# Patient Record
Sex: MALE | Race: WHITE | Employment: FULL TIME | URBAN - METROPOLITAN AREA
[De-identification: names, ages, dates, MRNs, and addresses within clinical notes are randomized per-mention and may not be internally consistent; named-entity substitution may affect disease eponyms.]

---

## 2017-01-06 NOTE — IMAGING NOTE
Called and talked to patient and instructed to be on NPO X 6 hours prior,have a ,be at the hospital not later than 0800am,and the recovery is 3-4 hours post procedure. Will hold the declofenac for now. Verbalized understanding ,reassured.  Will take h

## 2017-01-09 ENCOUNTER — APPOINTMENT (OUTPATIENT)
Dept: LAB | Facility: HOSPITAL | Age: 52
End: 2017-01-09
Attending: PHYSICIAN ASSISTANT
Payer: COMMERCIAL

## 2017-01-09 ENCOUNTER — HOSPITAL ENCOUNTER (OUTPATIENT)
Dept: CT IMAGING | Facility: HOSPITAL | Age: 52
Discharge: HOME | End: 2017-01-09
Attending: PHYSICIAN ASSISTANT
Payer: COMMERCIAL

## 2017-01-09 VITALS
TEMPERATURE: 98 F | BODY MASS INDEX: 31.82 KG/M2 | HEIGHT: 66 IN | DIASTOLIC BLOOD PRESSURE: 73 MMHG | RESPIRATION RATE: 16 BRPM | WEIGHT: 198 LBS | HEART RATE: 67 BPM | OXYGEN SATURATION: 99 % | SYSTOLIC BLOOD PRESSURE: 112 MMHG

## 2017-01-09 DIAGNOSIS — N28.89 RENAL MASS: Primary | ICD-10-CM

## 2017-01-09 DIAGNOSIS — N28.89 RENAL MASS: ICD-10-CM

## 2017-01-09 LAB
INR BLD: 1 (ref 0.89–1.12)
PSA SERPL DL<=0.01 NG/ML-MCNC: 13.5 SECONDS (ref 12.3–14.8)

## 2017-01-09 PROCEDURE — 50200 RENAL BIOPSY PERQ: CPT

## 2017-01-09 PROCEDURE — 99152 MOD SED SAME PHYS/QHP 5/>YRS: CPT

## 2017-01-09 PROCEDURE — 99153 MOD SED SAME PHYS/QHP EA: CPT

## 2017-01-09 PROCEDURE — 36415 COLL VENOUS BLD VENIPUNCTURE: CPT

## 2017-01-09 PROCEDURE — 85610 PROTHROMBIN TIME: CPT

## 2017-01-09 PROCEDURE — 88341 IMHCHEM/IMCYTCHM EA ADD ANTB: CPT | Performed by: PHYSICIAN ASSISTANT

## 2017-01-09 PROCEDURE — 88305 TISSUE EXAM BY PATHOLOGIST: CPT | Performed by: PHYSICIAN ASSISTANT

## 2017-01-09 PROCEDURE — 77012 CT SCAN FOR NEEDLE BIOPSY: CPT

## 2017-01-09 PROCEDURE — 88342 IMHCHEM/IMCYTCHM 1ST ANTB: CPT | Performed by: PHYSICIAN ASSISTANT

## 2017-01-09 RX ORDER — MIDAZOLAM HYDROCHLORIDE 1 MG/ML
INJECTION INTRAMUSCULAR; INTRAVENOUS
Status: COMPLETED
Start: 2017-01-09 | End: 2017-01-09

## 2017-01-09 RX ORDER — NALOXONE HYDROCHLORIDE 0.4 MG/ML
80 INJECTION, SOLUTION INTRAMUSCULAR; INTRAVENOUS; SUBCUTANEOUS AS NEEDED
Status: DISCONTINUED | OUTPATIENT
Start: 2017-01-09 | End: 2017-01-13

## 2017-01-09 RX ORDER — HYDROCODONE BITARTRATE AND ACETAMINOPHEN 5; 325 MG/1; MG/1
1 TABLET ORAL ONCE
Status: COMPLETED | OUTPATIENT
Start: 2017-01-09 | End: 2017-01-09

## 2017-01-09 RX ORDER — FLUMAZENIL 0.1 MG/ML
INJECTION, SOLUTION INTRAVENOUS
Status: DISCONTINUED
Start: 2017-01-09 | End: 2017-01-09 | Stop reason: WASHOUT

## 2017-01-09 RX ORDER — SODIUM CHLORIDE 9 MG/ML
INJECTION, SOLUTION INTRAVENOUS CONTINUOUS
Status: DISCONTINUED | OUTPATIENT
Start: 2017-01-09 | End: 2017-01-13

## 2017-01-09 RX ORDER — NALOXONE HYDROCHLORIDE 0.4 MG/ML
INJECTION, SOLUTION INTRAMUSCULAR; INTRAVENOUS; SUBCUTANEOUS
Status: DISCONTINUED
Start: 2017-01-09 | End: 2017-01-09 | Stop reason: WASHOUT

## 2017-01-09 RX ORDER — FLUMAZENIL 0.1 MG/ML
0.2 INJECTION, SOLUTION INTRAVENOUS AS NEEDED
Status: DISCONTINUED | OUTPATIENT
Start: 2017-01-09 | End: 2017-01-13

## 2017-01-09 RX ORDER — MIDAZOLAM HYDROCHLORIDE 1 MG/ML
1 INJECTION INTRAMUSCULAR; INTRAVENOUS EVERY 5 MIN PRN
Status: ACTIVE | OUTPATIENT
Start: 2017-01-09 | End: 2017-01-09

## 2017-01-09 RX ADMIN — HYDROCODONE BITARTRATE AND ACETAMINOPHEN 1 TABLET: 5; 325 TABLET ORAL at 11:13:00

## 2017-01-09 RX ADMIN — MIDAZOLAM HYDROCHLORIDE 1 MG: 1 INJECTION INTRAMUSCULAR; INTRAVENOUS at 09:47:00

## 2017-01-09 RX ADMIN — SODIUM CHLORIDE 10 ML: 9 INJECTION, SOLUTION INTRAVENOUS at 09:15:00

## 2017-01-09 NOTE — PROCEDURES
[facility]  Procedure Note    Ardyth Sickle Patient Status:  Outpatient    1965 MRN [id number]   [facility] CT Attending Raya Licona, [address] Day # 0 PCP Judi Blair MD     Procedure: ct guided renal biops

## 2017-01-09 NOTE — IMAGING NOTE
Report given to [facility] RN about this patient post CT GUIDED RENAL BIOPSY  With IV sedation. Patient had a stable VS,tolerated the procedure well.  To [facility] [id number] in stable post procedure condition per transport

## 2017-01-09 NOTE — PRE-SEDATION ASSESSMENT
H&P 12/20 reviewed, no changes. Pt with left renal mass for CT biopsy. No prior sedation. Airway checked.   I have discussed with the patient the potential benefits, risks and side effects of this procedure, the likelihood of the patient achieving goals; an

## 2017-01-09 NOTE — OR NURSING
Dr. Ross Calle called and updated on patients condition, ok to discharge patient to home. Discharge instructions discussed with patient and wife, verbalized understanding.

## 2017-01-21 PROBLEM — D30.02 RENAL ONCOCYTOMA OF LEFT KIDNEY: Status: ACTIVE | Noted: 2017-01-21

## 2017-02-06 ENCOUNTER — ANESTHESIA EVENT (OUTPATIENT)
Dept: SURGERY | Facility: HOSPITAL | Age: 52
DRG: 657 | End: 2017-02-06
Payer: COMMERCIAL

## 2017-02-06 ENCOUNTER — LAB ENCOUNTER (OUTPATIENT)
Dept: LAB | Facility: HOSPITAL | Age: 52
End: 2017-02-06
Attending: UROLOGY
Payer: COMMERCIAL

## 2017-02-06 DIAGNOSIS — D30.02 RENAL ONCOCYTOMA OF LEFT KIDNEY: ICD-10-CM

## 2017-02-06 LAB
ANTIBODY SCREEN: NEGATIVE
RH BLOOD TYPE: POSITIVE

## 2017-02-06 PROCEDURE — 86901 BLOOD TYPING SEROLOGIC RH(D): CPT

## 2017-02-06 PROCEDURE — 86850 RBC ANTIBODY SCREEN: CPT

## 2017-02-06 PROCEDURE — 36415 COLL VENOUS BLD VENIPUNCTURE: CPT

## 2017-02-06 PROCEDURE — 86900 BLOOD TYPING SEROLOGIC ABO: CPT

## 2017-02-21 ENCOUNTER — ANESTHESIA (OUTPATIENT)
Dept: SURGERY | Facility: HOSPITAL | Age: 52
DRG: 657 | End: 2017-02-21
Payer: COMMERCIAL

## 2017-02-21 ENCOUNTER — HOSPITAL ENCOUNTER (INPATIENT)
Facility: HOSPITAL | Age: 52
LOS: 3 days | Discharge: HOME | DRG: 657 | End: 2017-02-24
Attending: UROLOGY | Admitting: UROLOGY
Payer: COMMERCIAL

## 2017-02-21 ENCOUNTER — SURGERY (OUTPATIENT)
Age: 52
End: 2017-02-21

## 2017-02-21 DIAGNOSIS — D30.02 RENAL ONCOCYTOMA OF LEFT KIDNEY: Primary | ICD-10-CM

## 2017-02-21 DIAGNOSIS — I10 ESSENTIAL HYPERTENSION: ICD-10-CM

## 2017-02-21 LAB
BASOPHILS # BLD AUTO: 0.04 X10(3) UL (ref 0–0.1)
BASOPHILS NFR BLD AUTO: 0.2 %
EOSINOPHIL # BLD AUTO: 0 X10(3) UL (ref 0–0.3)
EOSINOPHIL NFR BLD AUTO: 0 %
ERYTHROCYTE [DISTWIDTH] IN BLOOD BY AUTOMATED COUNT: 12.8 % (ref 11.5–16)
GLUCOSE BLD-MCNC: 114 MG/DL (ref 65–99)
HCT VFR BLD AUTO: 39.9 % (ref 37–53)
HGB BLD-MCNC: 13.2 G/DL (ref 13–17)
IMMATURE GRANULOCYTE COUNT: 0.11 X10(3) UL (ref 0–1)
IMMATURE GRANULOCYTE RATIO %: 0.7 %
ISTAT BLOOD GAS BASE DEFICIT: -2 MMOL/L
ISTAT BLOOD GAS HCO3: 24 MEQ/L (ref 22–26)
ISTAT BLOOD GAS O2 SATURATION: 100 % (ref 92–100)
ISTAT BLOOD GAS PCO2: 48 MMHG (ref 35–45)
ISTAT BLOOD GAS PH: 7.31 (ref 7.35–7.45)
ISTAT BLOOD GAS PO2: 277 MMHG (ref 80–105)
ISTAT BLOOD GAS TCO2: 25 MMOL/L (ref 22–32)
ISTAT HEMATOCRIT: 40 % (ref 37–54)
ISTAT IONIZED CALCIUM: 1.21 MMOL/L (ref 1.12–1.32)
ISTAT POTASSIUM: 4.1 MMOL/L (ref 3.6–5.1)
ISTAT SODIUM: 138 MMOL/L (ref 136–144)
LYMPHOCYTES # BLD AUTO: 0.84 X10(3) UL (ref 0.9–4)
LYMPHOCYTES NFR BLD AUTO: 5.1 %
MCH RBC QN AUTO: 30.3 PG (ref 27–33.2)
MCHC RBC AUTO-ENTMCNC: 33.1 G/DL (ref 31–37)
MCV RBC AUTO: 91.5 FL (ref 80–99)
MONOCYTES # BLD AUTO: 0.77 X10(3) UL (ref 0.1–0.6)
MONOCYTES NFR BLD AUTO: 4.6 %
NEUTROPHIL ABS PRELIM: 14.85 X10 (3) UL (ref 1.3–6.7)
NEUTROPHILS # BLD AUTO: 14.85 X10(3) UL (ref 1.3–6.7)
NEUTROPHILS NFR BLD AUTO: 89.4 %
PLATELET # BLD AUTO: 345 10(3)UL (ref 150–450)
RBC # BLD AUTO: 4.36 X10(6)UL (ref 4.3–5.7)
RED CELL DISTRIBUTION WIDTH-SD: 42.4 FL (ref 35.1–46.3)
WBC # BLD AUTO: 16.6 X10(3) UL (ref 4–13)

## 2017-02-21 PROCEDURE — 85014 HEMATOCRIT: CPT

## 2017-02-21 PROCEDURE — 85025 COMPLETE CBC W/AUTO DIFF WBC: CPT | Performed by: ANESTHESIOLOGY

## 2017-02-21 PROCEDURE — 82803 BLOOD GASES ANY COMBINATION: CPT

## 2017-02-21 PROCEDURE — 8E0W4CZ ROBOTIC ASSISTED PROCEDURE OF TRUNK REGION, PERCUTANEOUS ENDOSCOPIC APPROACH: ICD-10-PCS | Performed by: UROLOGY

## 2017-02-21 PROCEDURE — 88307 TISSUE EXAM BY PATHOLOGIST: CPT | Performed by: UROLOGY

## 2017-02-21 PROCEDURE — 84295 ASSAY OF SERUM SODIUM: CPT

## 2017-02-21 PROCEDURE — 88341 IMHCHEM/IMCYTCHM EA ADD ANTB: CPT | Performed by: UROLOGY

## 2017-02-21 PROCEDURE — 88342 IMHCHEM/IMCYTCHM 1ST ANTB: CPT | Performed by: UROLOGY

## 2017-02-21 PROCEDURE — 84132 ASSAY OF SERUM POTASSIUM: CPT

## 2017-02-21 PROCEDURE — 0TB14ZZ EXCISION OF LEFT KIDNEY, PERCUTANEOUS ENDOSCOPIC APPROACH: ICD-10-PCS | Performed by: UROLOGY

## 2017-02-21 PROCEDURE — 82330 ASSAY OF CALCIUM: CPT

## 2017-02-21 DEVICE — SURGIFLO HEMOSTATIC MATRIX KIT: Type: IMPLANTABLE DEVICE | Site: ABDOMEN | Status: FUNCTIONAL

## 2017-02-21 RX ORDER — METOCLOPRAMIDE HYDROCHLORIDE 5 MG/ML
10 INJECTION INTRAMUSCULAR; INTRAVENOUS AS NEEDED
Status: DISCONTINUED | OUTPATIENT
Start: 2017-02-21 | End: 2017-02-21 | Stop reason: HOSPADM

## 2017-02-21 RX ORDER — ONDANSETRON 2 MG/ML
4 INJECTION INTRAMUSCULAR; INTRAVENOUS EVERY 6 HOURS PRN
Status: DISCONTINUED | OUTPATIENT
Start: 2017-02-21 | End: 2017-02-24

## 2017-02-21 RX ORDER — HYDROMORPHONE HYDROCHLORIDE 1 MG/ML
0.5 INJECTION, SOLUTION INTRAMUSCULAR; INTRAVENOUS; SUBCUTANEOUS EVERY 2 HOUR PRN
Status: DISCONTINUED | OUTPATIENT
Start: 2017-02-21 | End: 2017-02-24

## 2017-02-21 RX ORDER — NALOXONE HYDROCHLORIDE 0.4 MG/ML
80 INJECTION, SOLUTION INTRAMUSCULAR; INTRAVENOUS; SUBCUTANEOUS AS NEEDED
Status: DISCONTINUED | OUTPATIENT
Start: 2017-02-21 | End: 2017-02-21 | Stop reason: HOSPADM

## 2017-02-21 RX ORDER — SODIUM CHLORIDE, SODIUM LACTATE, POTASSIUM CHLORIDE, CALCIUM CHLORIDE 600; 310; 30; 20 MG/100ML; MG/100ML; MG/100ML; MG/100ML
INJECTION, SOLUTION INTRAVENOUS CONTINUOUS
Status: DISCONTINUED | OUTPATIENT
Start: 2017-02-21 | End: 2017-02-21

## 2017-02-21 RX ORDER — HYDROCODONE BITARTRATE AND ACETAMINOPHEN 5; 325 MG/1; MG/1
2 TABLET ORAL EVERY 4 HOURS PRN
Status: DISCONTINUED | OUTPATIENT
Start: 2017-02-21 | End: 2017-02-24

## 2017-02-21 RX ORDER — HEPARIN SODIUM 5000 [USP'U]/ML
5000 INJECTION, SOLUTION INTRAVENOUS; SUBCUTANEOUS ONCE
Status: COMPLETED | OUTPATIENT
Start: 2017-02-21 | End: 2017-02-21

## 2017-02-21 RX ORDER — HYDROCODONE BITARTRATE AND ACETAMINOPHEN 5; 325 MG/1; MG/1
2 TABLET ORAL AS NEEDED
Status: DISCONTINUED | OUTPATIENT
Start: 2017-02-21 | End: 2017-02-21 | Stop reason: HOSPADM

## 2017-02-21 RX ORDER — ROPIVACAINE HYDROCHLORIDE 5 MG/ML
INJECTION, SOLUTION EPIDURAL; INFILTRATION; PERINEURAL AS NEEDED
Status: DISCONTINUED | OUTPATIENT
Start: 2017-02-21 | End: 2017-02-21 | Stop reason: HOSPADM

## 2017-02-21 RX ORDER — LISINOPRIL AND HYDROCHLOROTHIAZIDE 12.5; 1 MG/1; MG/1
12.5 TABLET ORAL DAILY
Status: DISCONTINUED | OUTPATIENT
Start: 2017-02-21 | End: 2017-02-21 | Stop reason: RX

## 2017-02-21 RX ORDER — MEPERIDINE HYDROCHLORIDE 25 MG/ML
12.5 INJECTION INTRAMUSCULAR; INTRAVENOUS; SUBCUTANEOUS AS NEEDED
Status: COMPLETED | OUTPATIENT
Start: 2017-02-21 | End: 2017-02-21

## 2017-02-21 RX ORDER — ZOLPIDEM TARTRATE 5 MG/1
5 TABLET ORAL NIGHTLY PRN
Status: DISCONTINUED | OUTPATIENT
Start: 2017-02-21 | End: 2017-02-24

## 2017-02-21 RX ORDER — LISINOPRIL 10 MG/1
10 TABLET ORAL DAILY
Status: DISCONTINUED | OUTPATIENT
Start: 2017-02-22 | End: 2017-02-22

## 2017-02-21 RX ORDER — HEPARIN SODIUM 5000 [USP'U]/ML
INJECTION, SOLUTION INTRAVENOUS; SUBCUTANEOUS
Status: COMPLETED
Start: 2017-02-21 | End: 2017-02-21

## 2017-02-21 RX ORDER — PANTOPRAZOLE SODIUM 40 MG/1
40 TABLET, DELAYED RELEASE ORAL
Status: DISCONTINUED | OUTPATIENT
Start: 2017-02-22 | End: 2017-02-24

## 2017-02-21 RX ORDER — HYDROCODONE BITARTRATE AND ACETAMINOPHEN 5; 325 MG/1; MG/1
1 TABLET ORAL AS NEEDED
Status: DISCONTINUED | OUTPATIENT
Start: 2017-02-21 | End: 2017-02-21 | Stop reason: HOSPADM

## 2017-02-21 RX ORDER — ACETAMINOPHEN 10 MG/ML
INJECTION, SOLUTION INTRAVENOUS
Status: DISCONTINUED | OUTPATIENT
Start: 2017-02-21 | End: 2017-02-21 | Stop reason: HOSPADM

## 2017-02-21 RX ORDER — ACETAMINOPHEN 325 MG/1
650 TABLET ORAL EVERY 4 HOURS PRN
Status: DISCONTINUED | OUTPATIENT
Start: 2017-02-21 | End: 2017-02-24

## 2017-02-21 RX ORDER — DOCUSATE SODIUM 100 MG/1
100 CAPSULE, LIQUID FILLED ORAL 2 TIMES DAILY
Status: DISCONTINUED | OUTPATIENT
Start: 2017-02-21 | End: 2017-02-24

## 2017-02-21 RX ORDER — ONDANSETRON 2 MG/ML
4 INJECTION INTRAMUSCULAR; INTRAVENOUS AS NEEDED
Status: DISCONTINUED | OUTPATIENT
Start: 2017-02-21 | End: 2017-02-21 | Stop reason: HOSPADM

## 2017-02-21 RX ORDER — ONDANSETRON 4 MG/1
4 TABLET, FILM COATED ORAL EVERY 6 HOURS PRN
Status: DISCONTINUED | OUTPATIENT
Start: 2017-02-21 | End: 2017-02-24

## 2017-02-21 RX ORDER — SODIUM CHLORIDE, SODIUM LACTATE, POTASSIUM CHLORIDE, CALCIUM CHLORIDE 600; 310; 30; 20 MG/100ML; MG/100ML; MG/100ML; MG/100ML
INJECTION, SOLUTION INTRAVENOUS CONTINUOUS
Status: DISCONTINUED | OUTPATIENT
Start: 2017-02-21 | End: 2017-02-22

## 2017-02-21 RX ORDER — HYDROMORPHONE HYDROCHLORIDE 1 MG/ML
0.4 INJECTION, SOLUTION INTRAMUSCULAR; INTRAVENOUS; SUBCUTANEOUS EVERY 5 MIN PRN
Status: DISCONTINUED | OUTPATIENT
Start: 2017-02-21 | End: 2017-02-21 | Stop reason: HOSPADM

## 2017-02-21 RX ORDER — HYDROCODONE BITARTRATE AND ACETAMINOPHEN 5; 325 MG/1; MG/1
1 TABLET ORAL EVERY 4 HOURS PRN
Status: DISCONTINUED | OUTPATIENT
Start: 2017-02-21 | End: 2017-02-24

## 2017-02-21 RX ORDER — SODIUM CHLORIDE 9 MG/ML
INJECTION, SOLUTION INTRAVENOUS CONTINUOUS
Status: DISCONTINUED | OUTPATIENT
Start: 2017-02-21 | End: 2017-02-22

## 2017-02-21 RX ORDER — MEPERIDINE HYDROCHLORIDE 25 MG/ML
INJECTION INTRAMUSCULAR; INTRAVENOUS; SUBCUTANEOUS
Status: COMPLETED
Start: 2017-02-21 | End: 2017-02-21

## 2017-02-21 RX ORDER — HYDROMORPHONE HYDROCHLORIDE 1 MG/ML
INJECTION, SOLUTION INTRAMUSCULAR; INTRAVENOUS; SUBCUTANEOUS
Status: COMPLETED
Start: 2017-02-21 | End: 2017-02-21

## 2017-02-21 RX ORDER — LEVOTHYROXINE SODIUM 0.15 MG/1
150 TABLET ORAL
Status: DISCONTINUED | OUTPATIENT
Start: 2017-02-21 | End: 2017-02-24

## 2017-02-21 RX ORDER — MIDAZOLAM HYDROCHLORIDE 1 MG/ML
1 INJECTION INTRAMUSCULAR; INTRAVENOUS EVERY 5 MIN PRN
Status: DISCONTINUED | OUTPATIENT
Start: 2017-02-21 | End: 2017-02-21 | Stop reason: HOSPADM

## 2017-02-21 RX ORDER — HYDROMORPHONE HYDROCHLORIDE 1 MG/ML
1 INJECTION, SOLUTION INTRAMUSCULAR; INTRAVENOUS; SUBCUTANEOUS EVERY 2 HOUR PRN
Status: DISCONTINUED | OUTPATIENT
Start: 2017-02-21 | End: 2017-02-24

## 2017-02-21 NOTE — CONSULTS
[facility] hospitalist initial consult note  Patient was seen/examined on 2/21/17    PCP; Susan Josue MD  Consulted at the request of Dr. Narayan Washburn  Reason for consult medical co-management    HPI 47 yo male with hx of HTN, Hypothyroidism, thyroid ca EATING Disp: 30 capsule Rfl: 0   Levothyroxine Sodium (SYNTHROID) 150 MCG Oral Tab Take 1 tablet (150 mcg total) by mouth once daily.  Disp: 90 tablet Rfl: 3     ROS 10 systems reviewed and negative except as in HPi  PE:   02/21/17  1403   BP:    Pulse: 100

## 2017-02-21 NOTE — OPERATIVE REPORT
[facility] Urology       Operative Note      Hilton Diaz Patient Status:  Surgery Admit    1965 MRN [id number]   Location [facility] Attending Cecilia Carcamo MD   Hosp Day # 0 PCP Duane Converse, MD through the same incision and a camera was introduced. No organ injury was observed.   The remaining ports were placed in standard fashion under direct vision including an 8-mm trocar subcostally, an 8 mm trocar in left lower abdomen,  and another 12-mm ju interrupted suture. The skin incisions were approximated using 4-0 Monocryl subcuticular stitches. There were no complications during the surgery. I was present throughout the entirety of this procedure.       DISPOSITION:  The patient was transferred

## 2017-02-21 NOTE — OR PREOP
Patient able to converse and answer all questions in English without use of . Patient asks questions in [place] and states he understands all answers.

## 2017-02-21 NOTE — H&P
Pre-op Diagnosis: LEFT RENAL MASS    The above referenced H&P was reviewed by Nicolasa Davies MD on 2/21/2017, the patient was examined and no significant changes have occurred in the patient's condition since the H&P was performed.   I discussed with the

## 2017-02-21 NOTE — ANESTHESIA POSTPROCEDURE EVALUATION
[address] [facility],[address] Patient Status:  Surgery Admit   Age/Gender 46year old male MRN [id number]   [facility] SURGERY Attending Daisy Pham, [address] [place] [address] Se Day # 0 PCP Pili Preciado MD       Anesthesia Post-op Note

## 2017-02-21 NOTE — ANESTHESIA PREPROCEDURE EVALUATION
PRE-OP EVALUATION    Patient Name: [facility]    Pre-op Diagnosis: LEFT RENAL MASS    Procedure(s):  ROBOTIC ASSISTED LAPAROSCOPIC LEFT PARTIAL NEPHRECTOMY    Surgeon(s) and Role:     * Aida Stanley MD - Primary    Pre-op vitals reviewed. Neuro/Psych    Negative neuro/psych ROS.                           Per epic Patient Active Problem List:     Hypothyroidism     Sciatica     GERD (gastroesophageal reflux disease)     HTN (hypertension)     Renal oncocytoma of left kidney              Past

## 2017-02-22 ENCOUNTER — APPOINTMENT (OUTPATIENT)
Dept: GENERAL RADIOLOGY | Facility: HOSPITAL | Age: 52
DRG: 657 | End: 2017-02-22
Attending: INTERNAL MEDICINE
Payer: COMMERCIAL

## 2017-02-22 ENCOUNTER — APPOINTMENT (OUTPATIENT)
Dept: NUCLEAR MEDICINE | Facility: HOSPITAL | Age: 52
DRG: 657 | End: 2017-02-22
Attending: INTERNAL MEDICINE
Payer: COMMERCIAL

## 2017-02-22 LAB
ATRIAL RATE: 98 BPM
BASOPHILS # BLD AUTO: 0 X10(3) UL (ref 0–0.1)
BASOPHILS NFR BLD AUTO: 0 %
BUN BLD-MCNC: 42 MG/DL (ref 8–20)
CALCIUM BLD-MCNC: 7.7 MG/DL (ref 8.3–10.3)
CHLORIDE: 104 MMOL/L (ref 101–111)
CO2: 19 MMOL/L (ref 22–32)
CREAT BLD-MCNC: 2.81 MG/DL (ref 0.7–1.3)
EOSINOPHIL # BLD AUTO: 0 X10(3) UL (ref 0–0.3)
EOSINOPHIL NFR BLD AUTO: 0 %
ERYTHROCYTE [DISTWIDTH] IN BLOOD BY AUTOMATED COUNT: 12.9 % (ref 11.5–16)
GLUCOSE BLD-MCNC: 147 MG/DL (ref 70–99)
HCT VFR BLD AUTO: 31.3 % (ref 37–53)
HGB BLD-MCNC: 10.4 G/DL (ref 13–17)
IMMATURE GRANULOCYTE COUNT: 0.05 X10(3) UL (ref 0–1)
IMMATURE GRANULOCYTE RATIO %: 0.5 %
IONIZED CALCIUM: 1.05 MMOL/L (ref 1.12–1.32)
LYMPHOCYTES # BLD AUTO: 1.62 X10(3) UL (ref 0.9–4)
LYMPHOCYTES NFR BLD AUTO: 15.2 %
MCH RBC QN AUTO: 31 PG (ref 27–33.2)
MCHC RBC AUTO-ENTMCNC: 33.2 G/DL (ref 31–37)
MCV RBC AUTO: 93.4 FL (ref 80–99)
MONOCYTES # BLD AUTO: 1.02 X10(3) UL (ref 0.1–0.6)
MONOCYTES NFR BLD AUTO: 9.6 %
NEUTROPHIL ABS PRELIM: 7.97 X10 (3) UL (ref 1.3–6.7)
NEUTROPHILS # BLD AUTO: 7.97 X10(3) UL (ref 1.3–6.7)
NEUTROPHILS NFR BLD AUTO: 74.7 %
P AXIS: 12 DEGREES
P-R INTERVAL: 196 MS
PLATELET # BLD AUTO: 285 10(3)UL (ref 150–450)
POTASSIUM SERPL-SCNC: 5.5 MMOL/L (ref 3.6–5.1)
Q-T INTERVAL: 360 MS
QRS DURATION: 90 MS
QTC CALCULATION (BEZET): 459 MS
R AXIS: 4 DEGREES
RBC # BLD AUTO: 3.35 X10(6)UL (ref 4.3–5.7)
RED CELL DISTRIBUTION WIDTH-SD: 43.9 FL (ref 35.1–46.3)
SODIUM SERPL-SCNC: 136 MMOL/L (ref 136–144)
T AXIS: 3 DEGREES
VENTRICULAR RATE: 98 BPM
WBC # BLD AUTO: 10.7 X10(3) UL (ref 4–13)

## 2017-02-22 PROCEDURE — 80048 BASIC METABOLIC PNL TOTAL CA: CPT | Performed by: UROLOGY

## 2017-02-22 PROCEDURE — 85025 COMPLETE CBC W/AUTO DIFF WBC: CPT | Performed by: HOSPITALIST

## 2017-02-22 PROCEDURE — 93010 ELECTROCARDIOGRAM REPORT: CPT | Performed by: INTERNAL MEDICINE

## 2017-02-22 PROCEDURE — 78582 LUNG VENTILAT&PERFUS IMAGING: CPT

## 2017-02-22 PROCEDURE — 93005 ELECTROCARDIOGRAM TRACING: CPT

## 2017-02-22 PROCEDURE — 82330 ASSAY OF CALCIUM: CPT | Performed by: INTERNAL MEDICINE

## 2017-02-22 PROCEDURE — 71010 XR CHEST AP PORTABLE  (CPT=71010): CPT

## 2017-02-22 RX ORDER — HEPARIN SODIUM 5000 [USP'U]/ML
5000 INJECTION, SOLUTION INTRAVENOUS; SUBCUTANEOUS EVERY 12 HOURS SCHEDULED
Status: DISCONTINUED | OUTPATIENT
Start: 2017-02-22 | End: 2017-02-22

## 2017-02-22 RX ORDER — SODIUM POLYSTYRENE SULFONATE 15 G/60ML
15 SUSPENSION ORAL; RECTAL ONCE
Status: COMPLETED | OUTPATIENT
Start: 2017-02-22 | End: 2017-02-22

## 2017-02-22 RX ORDER — DIPHENHYDRAMINE HCL 25 MG
25 CAPSULE ORAL EVERY 4 HOURS PRN
Status: DISCONTINUED | OUTPATIENT
Start: 2017-02-22 | End: 2017-02-24

## 2017-02-22 NOTE — PLAN OF CARE
RN notified Dr. Nazario Persaud of new consult. Continue with fluids and POC. Will see patient today.

## 2017-02-22 NOTE — CONSULTS
[facility]  Report of Consultation    [address] Patient Status:  Inpatient    1965 MRN [id number]   [facility] 3NW-A Attending Tucker Ramos MD   Hosp Day # 1 PCP Gabbi Roper MD       REASON FOR CONSULT: Allergies    MEDICATIONS:       Current facility-administered medications:   •  diphenhydrAMINE (BENADRYL) cap/tab 25 mg, 25 mg, Oral, Q4H PRN  •  sodium bicarbonate 150 mEq in dextrose 5 % 1,000 mL infusion, 150 mEq, Intravenous, Continuous  •  Pantoprazo apex, no rub  Lungs: CTAB, no wheeze, no rale, no rhonchi  Abdomen: Soft, mildly tender  Extremities: warm, well perfused, no leg edema  Neurologic/Psych: mentating well    LABORATORY DATA:         Lab Results  Component Value Date   * 02/22/2017 perfusion in the setting of hypotension perioperatively and perhaps in part due to clamping of left renal artery x 10 min intraoperatively. Hyperkalemia is due to ANNA, decreased urine flow rates and acidosis.     He has a new murmur and sinus tachycardia

## 2017-02-22 NOTE — PROGRESS NOTES
[facility] hospitalist daily note  Patient was seen/eamined on 2/22/17    S; still has pain at the surgical site. Passing gas but no flatus.  No chest pain, no SOB, no nausea    Medications in EPIC    PE;   02/22/17  0835   BP: 123/62   Pulse: 91   Temp: 98 °

## 2017-02-22 NOTE — PLAN OF CARE
PAIN - ADULT    • Verbalizes/displays adequate comfort level or patient's stated pain goal Not Progressing          DISCHARGE PLANNING    • Discharge to home or other facility with appropriate resources Progressing        Patient/Family Goals    • Patient/

## 2017-02-22 NOTE — PROGRESS NOTES
Patient with low urine output over night, approximately 325 ml over 8 hours. Dr Ryan Helton informed. Further orders received for 0.9ns iv bolus over one hour. Orders implemented. Will continue to monitor.

## 2017-02-22 NOTE — PLAN OF CARE
Patient stated had urinated in toilet. \"felt like a lot. \" RN instructed importance of accurate I&O's. Will urinate in urinal. IVF's changed per Dr. Elis George. POC discussed. All questions and concerns addressed. Will continue to monitor.

## 2017-02-22 NOTE — PROGRESS NOTES
[facility] Urology   Progress Note  Agustin Israel Patient Status:  Inpatient    1965 MRN [id number]   [facility] 3NW-A Attending Sena Rapp MD   Hosp Day # 1 PCP Julieta Gaucher, MD     Subjective:  Ritchie Arguelles

## 2017-02-23 ENCOUNTER — APPOINTMENT (OUTPATIENT)
Dept: CV DIAGNOSTICS | Facility: HOSPITAL | Age: 52
DRG: 657 | End: 2017-02-23
Attending: INTERNAL MEDICINE
Payer: COMMERCIAL

## 2017-02-23 LAB
BASOPHILS # BLD AUTO: 0.03 X10(3) UL (ref 0–0.1)
BASOPHILS NFR BLD AUTO: 0.4 %
BILIRUB UR QL STRIP.AUTO: NEGATIVE
BUN BLD-MCNC: 24 MG/DL (ref 8–20)
CALCIUM BLD-MCNC: 7.9 MG/DL (ref 8.3–10.3)
CHLORIDE: 101 MMOL/L (ref 101–111)
CLARITY UR REFRACT.AUTO: CLEAR
CO2: 32 MMOL/L (ref 22–32)
CREAT BLD-MCNC: 0.98 MG/DL (ref 0.7–1.3)
CREATININE, OTHER BODY FLUID: 0.92 MG/DL
EOSINOPHIL # BLD AUTO: 0.02 X10(3) UL (ref 0–0.3)
EOSINOPHIL NFR BLD AUTO: 0.3 %
ERYTHROCYTE [DISTWIDTH] IN BLOOD BY AUTOMATED COUNT: 12.5 % (ref 11.5–16)
GLUCOSE BLD-MCNC: 143 MG/DL (ref 70–99)
GLUCOSE UR STRIP.AUTO-MCNC: NEGATIVE MG/DL
HCT VFR BLD AUTO: 24 % (ref 37–53)
HCT VFR BLD AUTO: 25.5 % (ref 37–53)
HGB BLD-MCNC: 8.1 G/DL (ref 13–17)
HGB BLD-MCNC: 8.4 G/DL (ref 13–17)
HGB BLD-MCNC: 8.7 G/DL (ref 13–17)
IMMATURE GRANULOCYTE COUNT: 0.02 X10(3) UL (ref 0–1)
IMMATURE GRANULOCYTE RATIO %: 0.3 %
KETONES UR STRIP.AUTO-MCNC: NEGATIVE MG/DL
LEUKOCYTE ESTERASE UR QL STRIP.AUTO: NEGATIVE
LYMPHOCYTES # BLD AUTO: 1.79 X10(3) UL (ref 0.9–4)
LYMPHOCYTES NFR BLD AUTO: 24.7 %
MCH RBC QN AUTO: 31.2 PG (ref 27–33.2)
MCHC RBC AUTO-ENTMCNC: 35 G/DL (ref 31–37)
MCV RBC AUTO: 89.2 FL (ref 80–99)
MONOCYTES # BLD AUTO: 0.76 X10(3) UL (ref 0.1–0.6)
MONOCYTES NFR BLD AUTO: 10.5 %
NEUTROPHIL ABS PRELIM: 4.63 X10 (3) UL (ref 1.3–6.7)
NEUTROPHILS # BLD AUTO: 4.63 X10(3) UL (ref 1.3–6.7)
NEUTROPHILS NFR BLD AUTO: 63.8 %
NITRITE UR QL STRIP.AUTO: NEGATIVE
PH UR STRIP.AUTO: 6 [PH] (ref 4.5–8)
PLATELET # BLD AUTO: 194 10(3)UL (ref 150–450)
POTASSIUM SERPL-SCNC: 3.6 MMOL/L (ref 3.6–5.1)
PROT UR STRIP.AUTO-MCNC: NEGATIVE MG/DL
RBC # BLD AUTO: 2.69 X10(6)UL (ref 4.3–5.7)
RED CELL DISTRIBUTION WIDTH-SD: 40.3 FL (ref 35.1–46.3)
SODIUM SERPL-SCNC: 138 MMOL/L (ref 136–144)
SP GR UR STRIP.AUTO: 1.01 (ref 1–1.03)
UROBILINOGEN UR STRIP.AUTO-MCNC: <2 MG/DL
WBC # BLD AUTO: 7.3 X10(3) UL (ref 4–13)

## 2017-02-23 PROCEDURE — 85018 HEMOGLOBIN: CPT | Performed by: UROLOGY

## 2017-02-23 PROCEDURE — 83735 ASSAY OF MAGNESIUM: CPT | Performed by: HOSPITALIST

## 2017-02-23 PROCEDURE — 85018 HEMOGLOBIN: CPT | Performed by: HOSPITALIST

## 2017-02-23 PROCEDURE — 82570 ASSAY OF URINE CREATININE: CPT | Performed by: UROLOGY

## 2017-02-23 PROCEDURE — 85014 HEMATOCRIT: CPT | Performed by: UROLOGY

## 2017-02-23 PROCEDURE — 85025 COMPLETE CBC W/AUTO DIFF WBC: CPT | Performed by: HOSPITALIST

## 2017-02-23 PROCEDURE — 80048 BASIC METABOLIC PNL TOTAL CA: CPT | Performed by: HOSPITALIST

## 2017-02-23 PROCEDURE — 93306 TTE W/DOPPLER COMPLETE: CPT

## 2017-02-23 PROCEDURE — 93306 TTE W/DOPPLER COMPLETE: CPT | Performed by: INTERNAL MEDICINE

## 2017-02-23 PROCEDURE — 81001 URINALYSIS AUTO W/SCOPE: CPT | Performed by: INTERNAL MEDICINE

## 2017-02-23 RX ORDER — SODIUM CHLORIDE 9 MG/ML
INJECTION, SOLUTION INTRAVENOUS CONTINUOUS
Status: DISCONTINUED | OUTPATIENT
Start: 2017-02-23 | End: 2017-02-24

## 2017-02-23 NOTE — PROGRESS NOTES
[facility]  Nephrology Progress Note    Jone Story Attending:  Hailey Mendenhall MD       SUBJECTIVE:     UOP 2.7 L over 24 hours. NaHCO3 gtt stopped this AM.  Getting TTE. Overall feeling well.     PHYSICAL EXAM:     Vital Signs:  BAABSO 0.03 02/23/2017     No results found for: MALBP, CREUR, CREAURINE, MIALBURINE, MCRRATIOUR, MALBCRECALC, MICROALBUMIN, CREAUR, MALBCREACALC    Lab Results  Component Value Date   COLORUR Straw 02/23/2017   CLARITY Clear 02/23/2017   SPECGRAVITY 1.0 PO.    Was hyperkalemic to 5.5 meq/L in the setting of ANNA, acidosis and low urine flow rates. Now this has resolved. Had murmur, remains tachycardic. V/Q low prob PE. TTE pending. Will sign off. Please call if ?/concerns arise.     Thank you for allo

## 2017-02-23 NOTE — PROGRESS NOTES
[facility]  Urology Progress Note    Danica Learn Patient Status:  Inpatient    1965 MRN [id number]   [facility] 3NW-A Attending Eulogio Khan MD   Hosp Day # 2 PCP Era Reyez MD     Subjective:  Bianca Cannon NaHCO3 IVF  -ANNA, resolved.   Serum creat 2.81-->0.98  -Hgb 13.2-->10.4-->8.4 - likely dilutional  -Heart murmur - TTE today  -Good UOP    Plan:    -Nephrology and hospitalist consultation appreciated  -Repeat H/H at noon  -Check LIN drain fluid for creat le

## 2017-02-23 NOTE — PLAN OF CARE
Patient resting in bed. VSS. Hr 90's, patient states his HR at rest is 50-60's. Denies any chest calf pain. RA. Lungs clear. POC discussed, all questions and concerns addressed. Will continue to monitor.

## 2017-02-23 NOTE — PROGRESS NOTES
Dr. Duke Rai paged regarding if ok to d/c ivf.    0818: Dr. Duke Rai notified of above and stated ok to d/c ivf. Will implement and continue to monitor.

## 2017-02-24 VITALS
TEMPERATURE: 98 F | HEIGHT: 67 IN | BODY MASS INDEX: 30.59 KG/M2 | RESPIRATION RATE: 18 BRPM | WEIGHT: 194.88 LBS | SYSTOLIC BLOOD PRESSURE: 125 MMHG | HEART RATE: 79 BPM | DIASTOLIC BLOOD PRESSURE: 84 MMHG | OXYGEN SATURATION: 100 %

## 2017-02-24 LAB
BUN BLD-MCNC: 15 MG/DL (ref 8–20)
CALCIUM BLD-MCNC: 8.3 MG/DL (ref 8.3–10.3)
CHLORIDE: 106 MMOL/L (ref 101–111)
CO2: 29 MMOL/L (ref 22–32)
CREAT BLD-MCNC: 0.82 MG/DL (ref 0.7–1.3)
DEPRECATED HBV CORE AB SER IA-ACNC: 91.6 NG/ML (ref 22–322)
ERYTHROCYTE [DISTWIDTH] IN BLOOD BY AUTOMATED COUNT: 12.5 % (ref 11.5–16)
GLUCOSE BLD-MCNC: 108 MG/DL (ref 70–99)
HAV AB SERPL IA-ACNC: 499 PG/ML (ref 193–986)
HAV IGM SER QL: 2.3 MG/DL (ref 1.7–3)
HCT VFR BLD AUTO: 22.4 % (ref 37–53)
HGB BLD-MCNC: 7.8 G/DL (ref 13–17)
HGB BLD-MCNC: 8.2 G/DL (ref 13–17)
IRON SATURATION: 12 % (ref 13–45)
IRON: 38 UG/DL (ref 45–182)
MCH RBC QN AUTO: 31.3 PG (ref 27–33.2)
MCHC RBC AUTO-ENTMCNC: 34.8 G/DL (ref 31–37)
MCV RBC AUTO: 90 FL (ref 80–99)
PLATELET # BLD AUTO: 199 10(3)UL (ref 150–450)
POTASSIUM SERPL-SCNC: 3.7 MMOL/L (ref 3.6–5.1)
POTASSIUM SERPL-SCNC: 4.3 MMOL/L (ref 3.6–5.1)
RBC # BLD AUTO: 2.49 X10(6)UL (ref 4.3–5.7)
RED CELL DISTRIBUTION WIDTH-SD: 40.8 FL (ref 35.1–46.3)
SODIUM SERPL-SCNC: 141 MMOL/L (ref 136–144)
TOTAL IRON BINDING CAPACITY: 305 UG/DL (ref 298–536)
TRANSFERRIN: 205 MG/DL (ref 200–360)
WBC # BLD AUTO: 5.7 X10(3) UL (ref 4–13)

## 2017-02-24 PROCEDURE — 85027 COMPLETE CBC AUTOMATED: CPT | Performed by: UROLOGY

## 2017-02-24 PROCEDURE — 83550 IRON BINDING TEST: CPT | Performed by: HOSPITALIST

## 2017-02-24 PROCEDURE — 84132 ASSAY OF SERUM POTASSIUM: CPT | Performed by: HOSPITALIST

## 2017-02-24 PROCEDURE — 82728 ASSAY OF FERRITIN: CPT | Performed by: HOSPITALIST

## 2017-02-24 PROCEDURE — 82607 VITAMIN B-12: CPT | Performed by: HOSPITALIST

## 2017-02-24 PROCEDURE — 83540 ASSAY OF IRON: CPT | Performed by: HOSPITALIST

## 2017-02-24 PROCEDURE — 85018 HEMOGLOBIN: CPT | Performed by: HOSPITALIST

## 2017-02-24 RX ORDER — FERROUS SULFATE 325(65) MG
325 TABLET ORAL
Qty: 30 TABLET | Refills: 0 | Status: SHIPPED | OUTPATIENT
Start: 2017-02-24 | End: 2017-03-06

## 2017-02-24 RX ORDER — LISINOPRIL AND HYDROCHLOROTHIAZIDE 12.5; 1 MG/1; MG/1
12.5 TABLET ORAL DAILY
Status: DISCONTINUED | OUTPATIENT
Start: 2017-02-24 | End: 2017-02-24 | Stop reason: SDUPTHER

## 2017-02-24 RX ORDER — POTASSIUM CHLORIDE 20 MEQ/1
40 TABLET, EXTENDED RELEASE ORAL ONCE
Status: COMPLETED | OUTPATIENT
Start: 2017-02-24 | End: 2017-02-24

## 2017-02-24 RX ORDER — PSEUDOEPHEDRINE HCL 30 MG
100 TABLET ORAL 2 TIMES DAILY
Qty: 60 CAPSULE | Refills: 0 | Status: ON HOLD | OUTPATIENT
Start: 2017-02-24 | End: 2017-05-12

## 2017-02-24 RX ORDER — ACETAMINOPHEN 325 MG/1
650 TABLET ORAL EVERY 4 HOURS PRN
Qty: 60 TABLET | Refills: 0 | Status: SHIPPED | COMMUNITY
Start: 2017-02-24 | End: 2019-03-25

## 2017-02-24 NOTE — PROGRESS NOTES
NURSING DISCHARGE NOTE    Discharged Home via Wheelchair. Accompanied by Spouse  Belongings Taken by patient/family. PATIENT DISCHARGED HOME IN STABLE CONDITION. PATIENT LEFT FLOOR PER WHEELCHAIR AND WAS ACCOMPANIED BY WIFE.  DISCHARGE INSTRUCTIONS

## 2017-02-24 NOTE — PROGRESS NOTES
PATIENT IS SALINE LOCKED, ON ROOM AIR, DENIES PAIN, LIN DRAIN IN PLACE, INCISIONS ARE C/D/I, VOIDING WELL, AMBULATES INDEPENDENTLY. POSSIBLE DISCHARGE HOME TONIGHT ONCE DR. Blake 36 SEES HIM.

## 2017-02-24 NOTE — PROGRESS NOTES
[facility] hospitalist daily note  Patient was seen and examined on 2/24/17    S: pain controlled.  Denies CP, SOB, LH, dizziness, palpitations    Medications in EPIC    PE;   02/24/17  1610   BP: 125/84   Pulse: 79   Temp: 98.1 °F (36.7 °C)   Resp: 18     Ge V/q low prob  - likely 2/2 volume depletion/anemia  - IVF ordered earlier after my eval -- HR improved since starting IVF. HR was 110s--> now 90s. 6 Beats of VT noted overnight on 2/23. BB initiated overnight. Plan to continue on discharge for now.  F/u wit

## 2017-02-24 NOTE — PLAN OF CARE
DISCHARGE PLANNING    • Discharge to home or other facility with appropriate resources Completed        PAIN - ADULT    • Verbalizes/displays adequate comfort level or patient's stated pain goal Completed        Patient/Family Goals    • Patient/Family Kenn

## 2017-02-24 NOTE — PROGRESS NOTES
[facility]  Urology Progress Note    Jone Story Patient Status:  Inpatient    1965 MRN [id number]   [facility] 3NW-A Attending Hailey Mendenhall MD   [address] Day # 3 PCP Elizabeth Matthews MD     Subjective:  Mohan Hancock murmur - TTE done  -Good UOP    Plan:    -Encouraged continued ambulation and use of IS x 10/hr  -Pain management  -HL IV   -Repeat Hgb today  -Appreciate hospitalist recommendations    Dr. Narayan Washburn to see patient later today.     Above discussed with

## 2017-02-24 NOTE — PROGRESS NOTES
Dr Dotty Posey informed of hgb of 7.8 this morning. Pt asymptomatic. No further orders received. Dr will pass onto oncoming hospitalist. Will continue to monitor and pass onto oncoming RN.

## 2017-02-24 NOTE — PROGRESS NOTES
[facility] hospitalist daily note  Patient was seen and examined on 2/23/17    S: pain controlled. Doing well. Had BM. No complaints.      Medications in EPIC    PE;   02/23/17  1647   BP: 133/86   Pulse: 91   Temp: 98.9 °F (37.2 °C)   Resp: 18     Gen: awake hydrochlorothiazide but now also stop lisinopril due to ANNA and hyperkalemia    Hypothyroidism: levothyroxine    Murmur  - echo pending    GERD; PPI    Preventative SCDs  patient is not ready for discharge  High complexity due to ANNA    Hemanth Wang MD  D

## 2017-02-24 NOTE — PROGRESS NOTES
Repeat hgb this evening 8. 1. hgb 8.7 this afternoon at 1200. Pt denies lightheadedness or dizziness. Vss. Dr. Jose Rafael Acosta paged and notified and new orders received for repeat hgb in am. Will implement and continue to monitor.

## 2017-02-24 NOTE — PROGRESS NOTES
Writing RN notified by telemetry that patient had 6 beats of vtach. Patient check on. Currently patient sitting in bed, comfortable. Reports no chest pain or SOB. NSR on the monitor. Strip sent up my telmetry. Dr Kirsty Marie informed of 6 beats of vtach.  Hakeem Friend

## 2017-02-24 NOTE — PROGRESS NOTES
02/24/17 1036   Clinical Encounter Type   Visited With Patient and family together   Patient's Supportive Strategies/Resources Patient expressed feeling connected, comforted and cared for by staff and family/sister and niece are visiting from [facility].    Pa

## 2017-02-27 NOTE — PAYOR COMM NOTE
Review Type: CONTINUED STAY  Reviewer: Frank Majano     Date: February 27, 2017 - 8:14 AM  Payor: [id number] Number:  [id number]  Admit date: 2/21/2017  5:43 AM    Discharge date:  2/24/17    Progress Notes by Wilder Dobbs MD at 2/  2.81*   0.98   0.82    --     CA   7.7*   7.9*   8.3    --     NA   136   138   141    --     K   5.5*   3.6   3.7   4.3    CL   104   101   106    --     CO2   19.0*   32.0   29.0    --         Assessment/plan   47 yo male with hx of HTN, Hypothyroidism,

## 2017-02-28 NOTE — DISCHARGE SUMMARY
[facility]  Discharge Summary    [address] Patient Status:  Inpatient    1965 MRN [id number]   [facility] 3NW-A Attending No att. providers found   Hosp Day # 3 PCP Zenia Chung MD     Date of Admission:  Dr Noam Whitten in 1-2 weeks     Jeffrey Sewell  2/28/2017  12:00 AM

## 2017-05-12 ENCOUNTER — APPOINTMENT (OUTPATIENT)
Dept: CT IMAGING | Facility: HOSPITAL | Age: 52
DRG: 392 | End: 2017-05-12
Attending: PHYSICIAN ASSISTANT
Payer: COMMERCIAL

## 2017-05-12 ENCOUNTER — HOSPITAL ENCOUNTER (INPATIENT)
Facility: HOSPITAL | Age: 52
LOS: 3 days | Discharge: HOME | DRG: 392 | End: 2017-05-15
Attending: HOSPITALIST | Admitting: HOSPITALIST
Payer: COMMERCIAL

## 2017-05-12 DIAGNOSIS — K57.20 COLONIC DIVERTICULAR ABSCESS: Primary | ICD-10-CM

## 2017-05-12 PROCEDURE — 87070 CULTURE OTHR SPECIMN AEROBIC: CPT | Performed by: PHYSICIAN ASSISTANT

## 2017-05-12 PROCEDURE — 85610 PROTHROMBIN TIME: CPT | Performed by: PHYSICIAN ASSISTANT

## 2017-05-12 PROCEDURE — 87205 SMEAR GRAM STAIN: CPT | Performed by: PHYSICIAN ASSISTANT

## 2017-05-12 PROCEDURE — 49406 IMAGE CATH FLUID PERI/RETRO: CPT | Performed by: PHYSICIAN ASSISTANT

## 2017-05-12 PROCEDURE — 87077 CULTURE AEROBIC IDENTIFY: CPT | Performed by: PHYSICIAN ASSISTANT

## 2017-05-12 PROCEDURE — 99152 MOD SED SAME PHYS/QHP 5/>YRS: CPT | Performed by: PHYSICIAN ASSISTANT

## 2017-05-12 PROCEDURE — 0W9G30Z DRAINAGE OF PERITONEAL CAVITY WITH DRAINAGE DEVICE, PERCUTANEOUS APPROACH: ICD-10-PCS | Performed by: RADIOLOGY

## 2017-05-12 PROCEDURE — 87075 CULTR BACTERIA EXCEPT BLOOD: CPT | Performed by: PHYSICIAN ASSISTANT

## 2017-05-12 PROCEDURE — 87186 SC STD MICRODIL/AGAR DIL: CPT | Performed by: PHYSICIAN ASSISTANT

## 2017-05-12 RX ORDER — LEVOTHYROXINE SODIUM 0.15 MG/1
150 TABLET ORAL
Status: DISCONTINUED | OUTPATIENT
Start: 2017-05-12 | End: 2017-05-15

## 2017-05-12 RX ORDER — NALOXONE HYDROCHLORIDE 0.4 MG/ML
80 INJECTION, SOLUTION INTRAMUSCULAR; INTRAVENOUS; SUBCUTANEOUS AS NEEDED
Status: DISCONTINUED | OUTPATIENT
Start: 2017-05-12 | End: 2017-05-13

## 2017-05-12 RX ORDER — HYDROMORPHONE HYDROCHLORIDE 1 MG/ML
0.5 INJECTION, SOLUTION INTRAMUSCULAR; INTRAVENOUS; SUBCUTANEOUS EVERY 2 HOUR PRN
Status: DISCONTINUED | OUTPATIENT
Start: 2017-05-12 | End: 2017-05-15

## 2017-05-12 RX ORDER — MIDAZOLAM HYDROCHLORIDE 1 MG/ML
1 INJECTION INTRAMUSCULAR; INTRAVENOUS EVERY 5 MIN PRN
Status: ACTIVE | OUTPATIENT
Start: 2017-05-12 | End: 2017-05-12

## 2017-05-12 RX ORDER — FLUMAZENIL 0.1 MG/ML
0.2 INJECTION, SOLUTION INTRAVENOUS AS NEEDED
Status: DISCONTINUED | OUTPATIENT
Start: 2017-05-12 | End: 2017-05-13

## 2017-05-12 RX ORDER — ONDANSETRON 2 MG/ML
4 INJECTION INTRAMUSCULAR; INTRAVENOUS EVERY 6 HOURS PRN
Status: DISCONTINUED | OUTPATIENT
Start: 2017-05-12 | End: 2017-05-15

## 2017-05-12 RX ORDER — HEPARIN SODIUM 5000 [USP'U]/ML
5000 INJECTION, SOLUTION INTRAVENOUS; SUBCUTANEOUS EVERY 8 HOURS SCHEDULED
Status: DISCONTINUED | OUTPATIENT
Start: 2017-05-12 | End: 2017-05-15

## 2017-05-12 RX ORDER — SODIUM CHLORIDE 9 MG/ML
INJECTION, SOLUTION INTRAVENOUS CONTINUOUS
Status: DISCONTINUED | OUTPATIENT
Start: 2017-05-12 | End: 2017-05-15

## 2017-05-12 RX ORDER — MIDAZOLAM HYDROCHLORIDE 1 MG/ML
INJECTION INTRAMUSCULAR; INTRAVENOUS
Status: COMPLETED
Start: 2017-05-12 | End: 2017-05-12

## 2017-05-12 RX ORDER — SODIUM CHLORIDE 9 MG/ML
INJECTION, SOLUTION INTRAVENOUS CONTINUOUS
Status: DISCONTINUED | OUTPATIENT
Start: 2017-05-12 | End: 2017-05-13

## 2017-05-12 RX ORDER — HYDROMORPHONE HYDROCHLORIDE 1 MG/ML
1 INJECTION, SOLUTION INTRAMUSCULAR; INTRAVENOUS; SUBCUTANEOUS EVERY 2 HOUR PRN
Status: DISCONTINUED | OUTPATIENT
Start: 2017-05-12 | End: 2017-05-15

## 2017-05-12 NOTE — CONSULTS
[facility]  Report of Consultation    Othella Grave Patient Status:  Inpatient    1965 MRN [id number]   [facility] 3NW-A Attending Carlo Ayala MD   Hosp Day # 0 PCP Vandana West MD     Reason for Consultation: Allergies    Current Medications:    No current facility-administered medications for this encounter. Home Medications:      No current facility-administered medications on file prior to encounter.   Current Outpatient Prescriptions on File Prior to [facility] STUDY: CT abdomen/pelvis, 12/20/2016.  TECHNIQUE:  Multiple axial images of the abdomen and pelvis were performed from the lung bases through the pubic symphysis after the injection of nonionic intravenous contrast. Oral contrast was used for the entire exa collection lateral to the region of diverticulitis, consistent with an abscess.  Elsewhere within the upper abdomen, a few air/gas pockets are seen within the nondependent portion of the abdomen, mostly on the right and in subdiaphragmatic location, consist the ventral resolution of this lesion, if clinically indicated. 4. Please see the above discussion for other details.  The findings were discussed with and acknowledged by Dr. Cristal Mcleod today,  5/12/2017 at 12:46 PM.        Problem List:    Patient A

## 2017-05-12 NOTE — PROGRESS NOTES
PAGED  TO INFORM HER OF PATIENT NO LONGER TAKING METOPROLOL AND ONLY TAKING LISINOPRIL-ORDER RECEIVED TO DISCONTINUE METOPROLOL.

## 2017-05-12 NOTE — H&P
[facility] Hospitalist History and Physical      CC: Abd pain    PCP: Corona Mcclure MD        History of Present Illness: Patient is a 46year old male with PMH sig for recurrent diverticulitis managed as outpatient presenting with abd pain that started ye ondansetron HCl    Allergies:  No Known Allergies     Past Social Hx:  Denies heavy ETOH use  Denies illicts    Fam Hx  Non contributory    Review of Systems: *Negative except as otherwise noted in HPI    Objective:   There were no vitals taken for this vis CA, with abnl deposit noted on CT today  - Unclear if abscess or metastatic deposit- will need to discuss with  on Monday, not urgent tonight    HTN  - Hold meds for now in light of infection, reassess in AM    Hypothyroidism  - Home meds    FEN:  - IVF

## 2017-05-13 RX ORDER — ACETAMINOPHEN 325 MG/1
650 TABLET ORAL EVERY 6 HOURS PRN
Status: DISCONTINUED | OUTPATIENT
Start: 2017-05-13 | End: 2017-05-15

## 2017-05-13 NOTE — IMAGING NOTE
S/p LLQ drain placement with Dr. Kin Huerta. Tolerated procedure and sedation well. Reports increased pain with any palpation around site or sudden movements. Temp 101.1 prior to drain placement. Dr. Kin Huerta and Mehul Vasques, juan j RN aware.  Report called to floor

## 2017-05-13 NOTE — PAYOR COMM NOTE
Attending Physician: Sandra Arteaga MD      5/12    DIRECT         PMH sig for recurrent diverticulitis managed as outpatient presenting with abd pain that started yesterday. Worse than prior episodes. No fevers but + chills at home.        See in [facility] to

## 2017-05-13 NOTE — PROGRESS NOTES
PATIENT HAS IV FLUIDS INFUSING, IV ZOSYN SCHEDULED, ON A CLEAR LIQUID DIET AND IS TOLERATING WELL, ON ROOM AIR, PRIMARILY [place] SPEAKING BUT UNDERSTANDS MOST ENGLISH.  PATIENT IS VOIDING WELL, AMBULATES INDEPENDENTLY, IR DRAIN WITH YELLOW DRAINAGE IS DEIDRAIN

## 2017-05-13 NOTE — PROGRESS NOTES
[facility]  Progress Note      Brittany Villanueva Patient Status:  Inpatient    1965 MRN [id number]   [facility] 3NW-A Attending Kerry Keen MD   [facility] Day # 1 PCP Elizabeth Matthews MD       Subjective:   Pt sitting up,

## 2017-05-13 NOTE — PROCEDURES
[facility]  Procedure Note    Len Cash Patient Status:  Inpatient    1965 MRN [id number]   [facility] 3NW-A Attending Gilford Guy,*   Hosp Day # 0 PCP Rosey Chen MD     Procedure: CT guided absc

## 2017-05-14 PROCEDURE — 87493 C DIFF AMPLIFIED PROBE: CPT | Performed by: HOSPITALIST

## 2017-05-14 NOTE — PROGRESS NOTES
Drain care completed at this time. Pt's wife Theador Chock in room and receptive to drain irrigation. Feels comfortable to assist in drain care at 1400.

## 2017-05-14 NOTE — DIETARY NOTE
Nutrition Short Note   Dietitian consult received for Low fiber diet education for diverticulitis. Education completed. Handouts provided to pt and pt's wife in [facility] [place] [place] RD available PRN.      Madi Brady RD, LDN

## 2017-05-14 NOTE — PROGRESS NOTES
[facility] Hospitalist Progress Note                                                                   [address] [facility]  2/[birth year]    CC: f/u complicated diverticulitis with abscess and perfo cultures, aerobic culture with pan sensitive e coli  -advance diet as tolerated per surgery  -Pain control prn    Hx of renal cell CA, with abnl deposit noted on CT today  - Unclear if abscess or metastatic deposit- will need to discuss with  on Monday

## 2017-05-14 NOTE — PLAN OF CARE
PAIN - ADULT    • Verbalizes/displays adequate comfort level or patient's stated pain goal Progressing          GASTROINTESTINAL - ADULT    • Minimal or absence of nausea and vomiting Progressing        VSS,afebrile. Reports moderate pain at drain site.  IV

## 2017-05-14 NOTE — PROGRESS NOTES
[facility]  Progress Note    Adah Shell Patient Status:  Inpatient    1965 MRN [id number]   [facility] 3NW-A Attending Romero Small MD   [address] Day # 2 PCP Marylen Revels, MD     Subjective:  Feels better, no fever

## 2017-05-14 NOTE — PROGRESS NOTES
[facility]  Progress Note      Adah Shell Patient Status:  Inpatient    1965 MRN [id number]   [facility] 3NW-A Attending Romero Small MD   Hosp Day # 2 PCP Marylen Revels, MD       Subjective:   Sitting in chair

## 2017-05-15 ENCOUNTER — TELEPHONE (OUTPATIENT)
Dept: CARDIOLOGY UNIT | Facility: HOSPITAL | Age: 52
End: 2017-05-15

## 2017-05-15 VITALS
OXYGEN SATURATION: 99 % | DIASTOLIC BLOOD PRESSURE: 78 MMHG | HEART RATE: 50 BPM | SYSTOLIC BLOOD PRESSURE: 123 MMHG | TEMPERATURE: 98 F | RESPIRATION RATE: 18 BRPM

## 2017-05-15 RX ORDER — AMOXICILLIN AND CLAVULANATE POTASSIUM 875; 125 MG/1; MG/1
1 TABLET, FILM COATED ORAL 2 TIMES DAILY
Qty: 28 TABLET | Refills: 0 | Status: SHIPPED | OUTPATIENT
Start: 2017-05-15 | End: 2017-05-29

## 2017-05-15 RX ORDER — HYDROCODONE BITARTRATE AND ACETAMINOPHEN 5; 325 MG/1; MG/1
1 TABLET ORAL EVERY 4 HOURS PRN
Qty: 20 TABLET | Refills: 0 | Status: SHIPPED | OUTPATIENT
Start: 2017-05-15 | End: 2017-05-25

## 2017-05-15 NOTE — PLAN OF CARE
Patients IV d/c'd, catheter intact. All discharge instructions explained. Supplies given.  Wife verbalizes

## 2017-05-15 NOTE — DISCHARGE SUMMARY
General Medicine Discharge Summary     Patient ID:  Gilmar Mart  46year old  5/26/1965    Admit date: 5/12/2017    Discharge date and time:  5/15/17    Attending Physician: No att. providers meds    Consults: IP CONSULT TO GENERAL SURGERY  IP CONSULT TO FOOD AND NUTRITION SERVICES    Operative Procedures:         Patient instructions:      Discharge Medication List as of 5/15/2017  3:20 PM    START taking these medications    Amoxicillin-Pot C

## 2017-05-15 NOTE — PROGRESS NOTES
Call placed to nurse for Dr. Ozzy Schaeffer regarding abnormal CT result in house concerning for recurrent renal mass. FU in 1 week arranged Thursday, May 25th at 3:30pm on [facility].     * Message received by Dr. Ozzy Schaeffer- path renal oncyocytoma- not RCC-

## 2017-05-15 NOTE — PAYOR COMM NOTE
Attending Physician: Perico Macedo,*    Review Type: ADMISSION   Reviewer: Carmen Gandara       Date: May 15, 2017 - 9:17 AM  Payor: [id number] Number: N/A  Admit date: 5/12/2017  3:02 PM   Admitted from Emergency Dept.: yes (DILAUDID) 1 MG/ML injection 1 mg     Date Action Dose Route User    5/14/2017 1441 Given 1 mg Intravenous Lindarokari, Manoj Childs, RN      Levothyroxine Sodium (SYNTHROID) tab 150 mcg     Date Action Dose Route User    5/15/2017 0607 Given 150 mcg Oral Rotel °C)] 98.7 °F (37.1 °C)  Pulse:  [59-85] 59  Resp:  [12-18] 18  BP: (101-132)/(54-80) 101/55 mmHg  Exam  Gen: No acute distres  Pulm: Lungs clear bilaterally, normal respiratory effort  CV: Heart with regular rate and rhythm, no murmur.  Normal PMI.     Abd readings    Hypothyroidism  - Home meds    FEN:  - IVF  - clears  - Lytes prn    Proph:  - DVT proph with heparin    Dispo:  - [facility] hospitalist service  - Consults include: Linh Amezquita MD        Progress Notes by César Carranza MD at 5/14/2017 1

## 2017-05-15 NOTE — PROGRESS NOTES
[facility]  Progress Note    Dimitri Joyce Patient Status:  Inpatient    1965 MRN [id number]   [facility] 3NW-A Attending Eliazar Juarez Day # 3 PCP Corona Mcclure MD     Subjective:    Patient sit

## 2017-05-16 ENCOUNTER — TELEPHONE (OUTPATIENT)
Dept: CARDIOLOGY UNIT | Facility: HOSPITAL | Age: 52
End: 2017-05-16

## 2017-05-16 NOTE — PROGRESS NOTES
[place] back from Dr. Elbert Jones regarding CT scan this admit and abnormality noted around left kidney. 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  Not concerned for recurrenc

## 2017-05-17 NOTE — PAYOR COMM NOTE
Review Type: CONTINUED STAY  Reviewer: Vane Alvarado     Date: May 17, 2017 - 11:04 AM  Payor: [id number] [facility] Number: Dom Thomasas date: 5/12/2017  3:02 PM     Discharge date:  5/15/17       Discharge Summaries by Juan Patterson 05/12/17   1130   05/12/17   1608    WBC   12.87    --     HGB   13.3    --     MCV   89.3    --     PLT   264    --     INR    --    1.12*          Recent Labs    Lab  05/12/17   1130    NA   643    K   4.2    CL   102    CO2   24.9    BUN   18    CREATSE

## 2017-05-19 PROBLEM — K57.92 ACUTE DIVERTICULITIS: Status: ACTIVE | Noted: 2017-05-19

## 2017-05-23 ENCOUNTER — NURSE ONLY (OUTPATIENT)
Dept: LAB | Facility: HOSPITAL | Age: 52
End: 2017-05-23
Attending: PHYSICIAN ASSISTANT
Payer: COMMERCIAL

## 2017-05-23 ENCOUNTER — HOSPITAL ENCOUNTER (OUTPATIENT)
Dept: CT IMAGING | Facility: HOSPITAL | Age: 52
Discharge: HOME | End: 2017-05-23
Attending: PHYSICIAN ASSISTANT
Payer: COMMERCIAL

## 2017-05-23 DIAGNOSIS — K57.20 COLONIC DIVERTICULAR ABSCESS: ICD-10-CM

## 2017-05-23 DIAGNOSIS — D30.02 RENAL ONCOCYTOMA OF LEFT KIDNEY: ICD-10-CM

## 2017-05-23 DIAGNOSIS — R10.32 LLQ ABDOMINAL PAIN: ICD-10-CM

## 2017-05-23 PROCEDURE — 85025 COMPLETE CBC W/AUTO DIFF WBC: CPT

## 2017-05-23 PROCEDURE — 49424 ASSESS CYST CONTRAST INJECT: CPT | Performed by: PHYSICIAN ASSISTANT

## 2017-05-23 PROCEDURE — 36415 COLL VENOUS BLD VENIPUNCTURE: CPT

## 2017-05-23 PROCEDURE — 76080 X-RAY EXAM OF FISTULA: CPT | Performed by: PHYSICIAN ASSISTANT

## 2017-10-13 RX ORDER — ACETAMINOPHEN, ASPIRIN AND CAFFEINE 250; 250; 65 MG/1; MG/1; MG/1
1 TABLET, FILM COATED ORAL EVERY 6 HOURS PRN
COMMUNITY
End: 2019-03-25

## 2017-10-13 RX ORDER — SODIUM CHLORIDE, SODIUM LACTATE, POTASSIUM CHLORIDE, CALCIUM CHLORIDE 600; 310; 30; 20 MG/100ML; MG/100ML; MG/100ML; MG/100ML
INJECTION, SOLUTION INTRAVENOUS CONTINUOUS
Status: CANCELLED | OUTPATIENT
Start: 2017-10-13

## 2017-11-01 ENCOUNTER — SURGERY (OUTPATIENT)
Age: 52
End: 2017-11-01

## 2017-11-01 ENCOUNTER — HOSPITAL ENCOUNTER (INPATIENT)
Facility: HOSPITAL | Age: 52
LOS: 2 days | Discharge: HOME | DRG: 331 | End: 2017-11-03
Attending: SURGERY | Admitting: SURGERY
Payer: COMMERCIAL

## 2017-11-01 ENCOUNTER — ANESTHESIA (OUTPATIENT)
Dept: SURGERY | Facility: HOSPITAL | Age: 52
DRG: 331 | End: 2017-11-01
Payer: COMMERCIAL

## 2017-11-01 ENCOUNTER — ANESTHESIA EVENT (OUTPATIENT)
Dept: SURGERY | Facility: HOSPITAL | Age: 52
DRG: 331 | End: 2017-11-01
Payer: COMMERCIAL

## 2017-11-01 DIAGNOSIS — K57.30 DIVERTICULOSIS LARGE INTESTINE W/O PERFORATION OR ABSCESS W/O BLEEDING: Primary | ICD-10-CM

## 2017-11-01 PROCEDURE — 88307 TISSUE EXAM BY PATHOLOGIST: CPT | Performed by: SURGERY

## 2017-11-01 PROCEDURE — 0DTG0ZZ RESECTION OF LEFT LARGE INTESTINE, OPEN APPROACH: ICD-10-PCS | Performed by: SURGERY

## 2017-11-01 RX ORDER — IBUPROFEN 600 MG/1
600 TABLET ORAL EVERY 6 HOURS PRN
Status: DISCONTINUED | OUTPATIENT
Start: 2017-11-01 | End: 2017-11-03

## 2017-11-01 RX ORDER — METOCLOPRAMIDE HYDROCHLORIDE 5 MG/ML
10 INJECTION INTRAMUSCULAR; INTRAVENOUS EVERY 6 HOURS PRN
Status: DISCONTINUED | OUTPATIENT
Start: 2017-11-01 | End: 2017-11-03

## 2017-11-01 RX ORDER — LIDOCAINE HYDROCHLORIDE 10 MG/ML
INJECTION, SOLUTION INFILTRATION; PERINEURAL AS NEEDED
Status: DISCONTINUED | OUTPATIENT
Start: 2017-11-01 | End: 2017-11-01 | Stop reason: HOSPADM

## 2017-11-01 RX ORDER — MEPERIDINE HYDROCHLORIDE 25 MG/ML
12.5 INJECTION INTRAMUSCULAR; INTRAVENOUS; SUBCUTANEOUS ONCE
Status: COMPLETED | OUTPATIENT
Start: 2017-11-01 | End: 2017-11-01

## 2017-11-01 RX ORDER — ENOXAPARIN SODIUM 100 MG/ML
40 INJECTION SUBCUTANEOUS DAILY
Status: DISCONTINUED | OUTPATIENT
Start: 2017-11-02 | End: 2017-11-03

## 2017-11-01 RX ORDER — HYDROMORPHONE HYDROCHLORIDE 1 MG/ML
0.5 INJECTION, SOLUTION INTRAMUSCULAR; INTRAVENOUS; SUBCUTANEOUS EVERY 30 MIN PRN
Status: DISCONTINUED | OUTPATIENT
Start: 2017-11-01 | End: 2017-11-03

## 2017-11-01 RX ORDER — HYDROMORPHONE HYDROCHLORIDE 1 MG/ML
0.4 INJECTION, SOLUTION INTRAMUSCULAR; INTRAVENOUS; SUBCUTANEOUS EVERY 5 MIN PRN
Status: DISCONTINUED | OUTPATIENT
Start: 2017-11-01 | End: 2017-11-01 | Stop reason: HOSPADM

## 2017-11-01 RX ORDER — SODIUM CHLORIDE 9 MG/ML
INJECTION, SOLUTION INTRAVENOUS CONTINUOUS
Status: DISCONTINUED | OUTPATIENT
Start: 2017-11-01 | End: 2017-11-03

## 2017-11-01 RX ORDER — SODIUM CHLORIDE, SODIUM LACTATE, POTASSIUM CHLORIDE, CALCIUM CHLORIDE 600; 310; 30; 20 MG/100ML; MG/100ML; MG/100ML; MG/100ML
INJECTION, SOLUTION INTRAVENOUS CONTINUOUS
Status: DISCONTINUED | OUTPATIENT
Start: 2017-11-01 | End: 2017-11-01

## 2017-11-01 RX ORDER — PANTOPRAZOLE SODIUM 40 MG/1
40 TABLET, DELAYED RELEASE ORAL EVERY 24 HOURS
Status: DISCONTINUED | OUTPATIENT
Start: 2017-11-01 | End: 2017-11-03

## 2017-11-01 RX ORDER — OXYCODONE HYDROCHLORIDE 5 MG/1
10 TABLET ORAL EVERY 4 HOURS PRN
Status: DISCONTINUED | OUTPATIENT
Start: 2017-11-01 | End: 2017-11-03

## 2017-11-01 RX ORDER — HYDROMORPHONE HYDROCHLORIDE 1 MG/ML
INJECTION, SOLUTION INTRAMUSCULAR; INTRAVENOUS; SUBCUTANEOUS
Status: COMPLETED
Start: 2017-11-01 | End: 2017-11-01

## 2017-11-01 RX ORDER — LEVOTHYROXINE SODIUM 0.15 MG/1
150 TABLET ORAL
Status: DISCONTINUED | OUTPATIENT
Start: 2017-11-02 | End: 2017-11-03

## 2017-11-01 RX ORDER — ONDANSETRON 2 MG/ML
4 INJECTION INTRAMUSCULAR; INTRAVENOUS AS NEEDED
Status: DISCONTINUED | OUTPATIENT
Start: 2017-11-01 | End: 2017-11-01 | Stop reason: HOSPADM

## 2017-11-01 RX ORDER — IBUPROFEN 400 MG/1
400 TABLET ORAL EVERY 6 HOURS PRN
Status: DISCONTINUED | OUTPATIENT
Start: 2017-11-01 | End: 2017-11-03

## 2017-11-01 RX ORDER — NALOXONE HYDROCHLORIDE 0.4 MG/ML
80 INJECTION, SOLUTION INTRAMUSCULAR; INTRAVENOUS; SUBCUTANEOUS AS NEEDED
Status: DISCONTINUED | OUTPATIENT
Start: 2017-11-01 | End: 2017-11-01 | Stop reason: HOSPADM

## 2017-11-01 RX ORDER — MEPERIDINE HYDROCHLORIDE 25 MG/ML
INJECTION INTRAMUSCULAR; INTRAVENOUS; SUBCUTANEOUS
Status: COMPLETED
Start: 2017-11-01 | End: 2017-11-01

## 2017-11-01 RX ORDER — HEPARIN SODIUM 5000 [USP'U]/ML
5000 INJECTION, SOLUTION INTRAVENOUS; SUBCUTANEOUS ONCE
Status: COMPLETED | OUTPATIENT
Start: 2017-11-01 | End: 2017-11-01

## 2017-11-01 RX ORDER — BACITRACIN 50000 [USP'U]/1
INJECTION, POWDER, LYOPHILIZED, FOR SOLUTION INTRAMUSCULAR AS NEEDED
Status: DISCONTINUED | OUTPATIENT
Start: 2017-11-01 | End: 2017-11-01 | Stop reason: HOSPADM

## 2017-11-01 RX ORDER — KETOROLAC TROMETHAMINE 30 MG/ML
30 INJECTION, SOLUTION INTRAMUSCULAR; INTRAVENOUS EVERY 6 HOURS PRN
Status: DISCONTINUED | OUTPATIENT
Start: 2017-11-01 | End: 2017-11-03

## 2017-11-01 RX ORDER — ACETAMINOPHEN 325 MG/1
650 TABLET ORAL 4 TIMES DAILY
Status: DISCONTINUED | OUTPATIENT
Start: 2017-11-01 | End: 2017-11-03

## 2017-11-01 RX ORDER — KETOROLAC TROMETHAMINE 15 MG/ML
15 INJECTION, SOLUTION INTRAMUSCULAR; INTRAVENOUS EVERY 6 HOURS PRN
Status: DISCONTINUED | OUTPATIENT
Start: 2017-11-01 | End: 2017-11-03

## 2017-11-01 RX ORDER — POLYETHYLENE GLYCOL 3350 17 G/17G
1 POWDER, FOR SOLUTION ORAL DAILY PRN
Status: DISCONTINUED | OUTPATIENT
Start: 2017-11-01 | End: 2017-11-03

## 2017-11-01 RX ORDER — LISINOPRIL AND HYDROCHLOROTHIAZIDE 12.5; 1 MG/1; MG/1
1 TABLET ORAL
Status: DISCONTINUED | OUTPATIENT
Start: 2017-11-01 | End: 2017-11-01 | Stop reason: SDUPTHER

## 2017-11-01 RX ORDER — OXYCODONE HYDROCHLORIDE 5 MG/1
5 TABLET ORAL EVERY 4 HOURS PRN
Status: DISCONTINUED | OUTPATIENT
Start: 2017-11-01 | End: 2017-11-03

## 2017-11-01 RX ORDER — SODIUM CHLORIDE 9 MG/ML
INJECTION, SOLUTION INTRAVENOUS CONTINUOUS
Status: DISCONTINUED | OUTPATIENT
Start: 2017-11-01 | End: 2017-11-01

## 2017-11-01 RX ORDER — BUPIVACAINE HYDROCHLORIDE 5 MG/ML
INJECTION, SOLUTION EPIDURAL; INTRACAUDAL AS NEEDED
Status: DISCONTINUED | OUTPATIENT
Start: 2017-11-01 | End: 2017-11-01 | Stop reason: HOSPADM

## 2017-11-01 RX ORDER — OXYCODONE HYDROCHLORIDE 5 MG/1
2.5 TABLET ORAL EVERY 4 HOURS PRN
Status: DISCONTINUED | OUTPATIENT
Start: 2017-11-01 | End: 2017-11-03

## 2017-11-01 RX ORDER — ACETAMINOPHEN 500 MG
1000 TABLET ORAL ONCE
Status: COMPLETED | OUTPATIENT
Start: 2017-11-01 | End: 2017-11-01

## 2017-11-01 RX ORDER — MAGNESIUM HYDROXIDE 1200 MG/15ML
LIQUID ORAL CONTINUOUS PRN
Status: DISCONTINUED | OUTPATIENT
Start: 2017-11-01 | End: 2017-11-01

## 2017-11-01 RX ORDER — ONDANSETRON 2 MG/ML
4 INJECTION INTRAMUSCULAR; INTRAVENOUS EVERY 6 HOURS PRN
Status: DISCONTINUED | OUTPATIENT
Start: 2017-11-01 | End: 2017-11-03

## 2017-11-01 RX ORDER — METOCLOPRAMIDE HYDROCHLORIDE 5 MG/ML
10 INJECTION INTRAMUSCULAR; INTRAVENOUS AS NEEDED
Status: DISCONTINUED | OUTPATIENT
Start: 2017-11-01 | End: 2017-11-01 | Stop reason: HOSPADM

## 2017-11-01 RX ORDER — SODIUM PHOSPHATE, DIBASIC AND SODIUM PHOSPHATE, MONOBASIC 7; 19 G/133ML; G/133ML
1 ENEMA RECTAL ONCE AS NEEDED
Status: DISCONTINUED | OUTPATIENT
Start: 2017-11-01 | End: 2017-11-01 | Stop reason: HOSPADM

## 2017-11-01 RX ORDER — METRONIDAZOLE 500 MG/100ML
500 INJECTION, SOLUTION INTRAVENOUS ONCE
Status: COMPLETED | OUTPATIENT
Start: 2017-11-01 | End: 2017-11-01

## 2017-11-01 NOTE — ANESTHESIA PREPROCEDURE EVALUATION
PRE-OP EVALUATION    Patient Name: Yareli Velasco    Pre-op Diagnosis: RECURRENT DIVERTICULITIS     Procedure(s):  LAPAROSCOPIC LOW ANTERIOR COLON RESECTION RIGID PROCTOSCOPY    Surgeon(s) and Role:     Lee Medeiros MD - Primary    Pre-op christie Oral Tab TAKE 1 TABLET BY MOUTH DAILY. Disp: 90 tablet Rfl: 3   acetaminophen 325 MG Oral Tab Take 2 tablets (650 mg total) by mouth every 4 (four) hours as needed.  Disp: 60 tablet Rfl: 0       Allergies: Review of patient's allergies indicates no known al Dental  Comment: Hx of paralyzed Left vocal cord  No notable dental history. Pulmonary    Pulmonary exam normal.  Breath sounds clear to auscultation bilaterally.                Other findings            ASA: 2   Plan: general  NPO status verified

## 2017-11-01 NOTE — BRIEF OP NOTE
Pre-Operative Diagnosis: RECURRENT DIVERTICULITIS      Post-Operative Diagnosis: RECURRENT DIVERTICULITIS      Procedure Performed:   Procedure(s):  LAPAROSCOPIC LOW ANTERIOR COLON RESECTION RIGID PROCTOSCOPY  LEFT HEMICOLECTOMY    Surgeon(s) and Role:

## 2017-11-01 NOTE — H&P
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

## 2017-11-01 NOTE — ANESTHESIA POSTPROCEDURE EVALUATION
[facility]    Paula Dates Patient Status:  Surgery Admit   Age/Gender 46year old male MRN [id number]   Location [address] [facility] Attending José Mcguire MD   Hosp Day # 0 PCP Burney Genet, MD Rossie Simmonds

## 2017-11-01 NOTE — CONSULTS
.  Reason for consult: periop mgmt    Consulted by: Dr. Daniel Zazueta    PCP: Jyoti Ospina MD      History of Present Illness: Patient is a 46year old male with PMH sig for gerd, HTN, hypothyroidism, and diverticular disease who presented for lap low ante BY MOUTH 3 (THREE) TIMES DAILY AS NEEDED.  Disp: 90 tablet Rfl: 0   LANSOPRAZOLE 30 MG Oral Capsule Delayed Release TAKE ONE CAPSULE BY MOUTH EVERY DAY BEFORE EATING (Patient taking differently: TAKE ONE CAPSULE BY MOUTH EVERY DAY BEFORE EATING PRN) Disp: 9 without obvious abnormality, atraumatic. Eyes:  Sclera anicteric, No conjunctival pallor, EOMs intact. Nose: Nares normal. Septum midline. Mucosa normal. No drainage.    Throat: Lips, mucosa, and tongue normal. Teeth and gums normal.   Neck: Supple, sy [phone]  [facility] Service number: [phone]

## 2017-11-02 PROCEDURE — C9113 INJ PANTOPRAZOLE SODIUM, VIA: HCPCS | Performed by: SURGERY

## 2017-11-02 PROCEDURE — 85027 COMPLETE CBC AUTOMATED: CPT | Performed by: HOSPITALIST

## 2017-11-02 NOTE — PROGRESS NOTES
PT UP IN CHAIR MOST OF DAY, WALKS IN FARR WITH STEADY GAIT, ABD SOFT, BOWEL SOUNDS PRESENT, STATES PASSES SOME FLATUS AND HAD 2 LOOSE BROWN STOOLS THIS AFTERNOON, TAKES CLEAR LIQUIDS WITHOUT NAUSEA AND DIET ADVANCED TO FULL LIQUIDS THIS EVENING, DENIES ANY

## 2017-11-02 NOTE — PAYOR COMM NOTE
--------------  ADMISSION REVIEW     Payor: NILDA OPEN ACCESS     11/1    DIRECT FOR OR    Pre-Operative Diagnosis: RECURRENT DIVERTICULITIS      Post-Operative Diagnosis: RECURRENT DIVERTICULITIS      Procedure Performed:   Procedure(s):  LAPAROSCOPIC LOW

## 2017-11-02 NOTE — PROGRESS NOTES
[facility]  Progress Note    Mary Taylor Patient Status:  Inpatient    1965 MRN [id number]   [facility] 3NW-A Attending Anahi Pereira MD   Hosp Day # 1 PCP Lorrie Quiroga MD     Subjective:  Up in chair.  Daniela Most

## 2017-11-02 NOTE — PROGRESS NOTES
[facility] Hospitalist Progress Note     PCP: Susan Josue MD    CC:  Follow up    SUBJECTIVE:Pt sitting up in chair, wife at bedside  -tolerating clears. +flatus.  Benz removed this AM-has not urinated yet  -pain is 6/10 but can have spasms that go up Subcutaneous Daily   • Levothyroxine Sodium  150 mcg Oral Before breakfast   • lisinopril-hydrochlorothiazide (ZESTORETIC 10/12. 5) combination tablet   Oral Daily     • sodium chloride 50 mL/hr at 11/02/17 0748     ibuprofen **OR** ibuprofen, HYDROmorphone

## 2017-11-03 VITALS
RESPIRATION RATE: 18 BRPM | DIASTOLIC BLOOD PRESSURE: 60 MMHG | HEART RATE: 66 BPM | BODY MASS INDEX: 29.13 KG/M2 | WEIGHT: 185.63 LBS | OXYGEN SATURATION: 97 % | HEIGHT: 67 IN | TEMPERATURE: 99 F | SYSTOLIC BLOOD PRESSURE: 127 MMHG

## 2017-11-03 PROCEDURE — 85025 COMPLETE CBC W/AUTO DIFF WBC: CPT | Performed by: HOSPITALIST

## 2017-11-03 RX ORDER — HYDROCODONE BITARTRATE AND ACETAMINOPHEN 5; 325 MG/1; MG/1
1 TABLET ORAL EVERY 4 HOURS PRN
Qty: 20 TABLET | Refills: 0 | Status: SHIPPED | OUTPATIENT
Start: 2017-11-03 | End: 2017-11-13

## 2017-11-03 NOTE — PROGRESS NOTES
NURSING DISCHARGE NOTE    Discharged pt via wheelchair to [email or web address]. Accompanied by spouse and family memeber. Belongings at hand. IV catheter d/c'd; catheter intact. LIN drain removed prior to discharged and dressed with gauze and paper tape.  Azael Cervantes

## 2017-11-03 NOTE — PROGRESS NOTES
[facility]  Progress Note    Barby Carvajal Patient Status:  Inpatient    1965 MRN [id number]   [facility] 3NW-A Attending Myron Maloney MD   Hosp Day # 2 PCP Elizabeth Matthews MD     Subjective:    Patient has had 2

## 2017-11-03 NOTE — PLAN OF CARE
GENITOURINARY - ADULT    • Absence of urinary retention Completed          DISCHARGE PLANNING    • Discharge to home or other facility with appropriate resources Progressing        GASTROINTESTINAL - ADULT    • Minimal or absence of nausea and vomiting Pro

## 2017-11-03 NOTE — PROGRESS NOTES
[facility] Hospitalist Progress Note     PCP: Jessie Lowe MD    CC:  Follow up    SUBJECTIVE:  Pt in restroom --quickly said hello--says he's doing fine. Family at bedside.   Walked halls, +BMs  Will have soft diet this AM, +U  OBJECTIVE:  Temp:  [97. chloride Stopped (11/02/17 2123)     ibuprofen **OR** ibuprofen, HYDROmorphone HCl PF, ketorolac (TORADOL) injection **OR** ketorolac (TORADOL) injection, OxyCODONE HCl **OR** oxyCODONE HCl **OR** oxyCODONE HCl, PEG 3350, ondansetron HCl, Metoclopramide HC

## 2017-11-03 NOTE — PLAN OF CARE
DISCHARGE PLANNING    • Discharge to home or other facility with appropriate resources Completed        GASTROINTESTINAL - ADULT    • Minimal or absence of nausea and vomiting Completed    • Maintains or returns to baseline bowel function Completed

## 2017-11-06 NOTE — PAYOR COMM NOTE
--------------  DISCHARGE REVIEW    Payor: Adriel Gutierrez  #:  [id number]  Authorization Number: Nadja Lombardi date: 11/1/17  Admit time:  3222  Discharge Date: 11/3/2017  5:54 PM     Admitting Physician: Jadiel Moise MD  Attending P

## 2017-11-06 NOTE — OPERATIVE REPORT
[facility]    PATIENT'S NAME: Chucho Ramírez   ATTENDING PHYSICIAN: Cynthia Topete M.D. OPERATING PHYSICIAN: Cynthia Topete M.D.    PATIENT ACCOUNT#:   [de-identified]    LOCATION:  [address] [place] [address] [facility]  MEDICAL RECORD #:   [id number]       DATE OF BIR area of obvious denuded sigmoid colon to the proximal descending colon.   Upon this mobilization around the kidney, there appeared to be increased inflammation and scarring suggestive of a second area of diverticular change and history of diverticulitis in

## 2017-11-06 NOTE — DISCHARGE SUMMARY
[facility]  Discharge Summary    Yareli Velasco Patient Status:  Inpatient    1965 MRN [id number]   [facility] 3NW-A Attending No att. providers found   Hosp Day # 2 PCP Kang More MD     Date of Admission:  tablet, R-2    LISINOPRIL-HYDROCHLOROTHIAZIDE 10-12.5 MG Oral Tab  TAKE 1 TABLET BY MOUTH DAILY., Normal, Disp-90 tablet, R-3    acetaminophen 325 MG Oral Tab  Take 2 tablets (650 mg total) by mouth every 4 (four) hours as needed., OTC, Disp-60 tablet, R-0

## 2018-10-22 PROBLEM — G89.29 CHRONIC PAIN OF BOTH SHOULDERS: Status: ACTIVE | Noted: 2018-10-22

## 2018-10-22 PROBLEM — M25.512 CHRONIC PAIN OF BOTH SHOULDERS: Status: ACTIVE | Noted: 2018-10-22

## 2018-10-22 PROBLEM — M25.511 CHRONIC PAIN OF BOTH SHOULDERS: Status: ACTIVE | Noted: 2018-10-22

## 2019-03-25 PROBLEM — K57.92 ACUTE DIVERTICULITIS: Status: RESOLVED | Noted: 2017-05-19 | Resolved: 2019-03-25

## 2019-03-26 PROBLEM — R07.89 OTHER CHEST PAIN: Status: ACTIVE | Noted: 2019-03-26

## 2019-03-26 PROBLEM — I44.7 LBBB (LEFT BUNDLE BRANCH BLOCK): Status: ACTIVE | Noted: 2019-03-26

## 2019-03-26 PROBLEM — B34.9 VIRAL ILLNESS: Status: ACTIVE | Noted: 2019-03-26

## 2021-01-18 PROBLEM — Z90.49 S/P PARTIAL RESECTION OF COLON: Status: ACTIVE | Noted: 2021-01-18

## 2021-01-18 PROBLEM — Z85.850 HISTORY OF THYROID CANCER: Status: ACTIVE | Noted: 2021-01-18

## 2021-01-18 PROBLEM — K57.90 DIVERTICULOSIS: Status: ACTIVE | Noted: 2021-01-18

## 2021-03-30 NOTE — PROGRESS NOTES
[facility] Hospitalist Progress Note                                                                   [address] [facility]  2/48/3740    CC: f/u complicated diverticulitis with abscess and perfo 5/12/17  -Continue IV Zosyn  -f/u cultures, aerobic culture with heavy gram negative becca  - IVF  - Clears, advance as tolerated  -Pain control    Hx of renal cell CA, with abnl deposit noted on CT today  - Unclear if abscess or metastatic deposit- will nee rolling walker

## (undated) DEVICE — CAUTERY PENCIL

## (undated) DEVICE — STRYKER HARMONIC 36CM REPRCSED

## (undated) DEVICE — SPECIMEN CONTAINER,POSITIVE SEAL INDICATOR, OR PACKAGED: Brand: PRECISION

## (undated) DEVICE — ENDOPATH ECHELON ENDOSCOPIC LINEAR CUTTER RELOADS, BLUE, 60MM: Brand: ECHELON ENDOPATH

## (undated) DEVICE — TROCAR BLADELESS 12MM B12LT

## (undated) DEVICE — GOWN,SIRUS,FABRIC-REINFORCED,X-LARGE: Brand: MEDLINE

## (undated) DEVICE — PROXIMATE SKIN STAPLERS (35 WIDE) CONTAINS 35 STAINLESS STEEL STAPLES (FIXED HEAD): Brand: PROXIMATE

## (undated) DEVICE — FLOSEAL SEALENT STERILE 10ML

## (undated) DEVICE — MONOPOLAR CURVED SCISSORS: Brand: ENDOWRIST;DAVINCI SI

## (undated) DEVICE — DV OBTURATOR BLADELESS 8MM

## (undated) DEVICE — TROCAR: Brand: KII SHIELDED BLADED ACCESS SYSTEM

## (undated) DEVICE — SUTURE SILK 0

## (undated) DEVICE — DERMABOND LIQUID ADHESIVE

## (undated) DEVICE — WOUND RETRACTOR AND PROTECTOR: Brand: ALEXIS O WOUND PROTECTOR-RETRACTOR

## (undated) DEVICE — SUTURE VICRYL 0 CT-1

## (undated) DEVICE — Device

## (undated) DEVICE — #15 STERILE STAINLESS BLADE: Brand: STERILE STAINLESS BLADES

## (undated) DEVICE — SUTURE SILK 3-0 SH

## (undated) DEVICE — DV KIT ACCESSORY 4-ARM

## (undated) DEVICE — BIPOLAR FORCEPS CORD,BANANA LEADS: Brand: VALLEYLAB

## (undated) DEVICE — SUTURE VICRYL 1 CT-1

## (undated) DEVICE — KENDALL SCD EXPRESS SLEEVES, KNEE LENGTH, MEDIUM: Brand: KENDALL SCD

## (undated) DEVICE — SUTURE MONOCRYL 4-0 PS-2

## (undated) DEVICE — SUTURE SILK 2-0

## (undated) DEVICE — STERILE POLYISOPRENE POWDER-FREE SURGICAL GLOVES: Brand: PROTEXIS

## (undated) DEVICE — SUTURE VICRYL 0

## (undated) DEVICE — SUTURE VICRYL 0 UR-6

## (undated) DEVICE — STAPLER CIRCULAR ENDO 29MM

## (undated) DEVICE — TROCAR: Brand: KII® SLEEVE

## (undated) DEVICE — SOL  .9 1000ML BTL

## (undated) DEVICE — TROCAR: Brand: KII FIOS FIRST ENTRY

## (undated) DEVICE — LAP CHOLE/APPY CDS-LF: Brand: MEDLINE INDUSTRIES, INC.

## (undated) DEVICE — SUTURE PDS LOOPED 60CM

## (undated) DEVICE — TROCARS: Brand: KII® BALLOON BLUNT TIP SYSTEM

## (undated) DEVICE — LAP COLON CDS: Brand: MEDLINE INDUSTRIES, INC.

## (undated) DEVICE — SUTURE ETHILON 2-0 FS

## (undated) DEVICE — ANCHOR TISSUE RETRIEVAL SYSTEM, BAG SIZE 175 ML, PORT SIZE 10 MM: Brand: ANCHOR TISSUE RETRIEVAL SYSTEM

## (undated) DEVICE — PAD SACRAL SPAN AID

## (undated) DEVICE — SURGICEL SNOW ABS 4X4

## (undated) DEVICE — SOL H2O 1000ML BTL

## (undated) DEVICE — TIP COVER ACCESSORY

## (undated) DEVICE — DRAPE SADDLE BAG-DUAL INST

## (undated) DEVICE — STANDARD HYPODERMIC NEEDLE,POLYPROPYLENE HUB: Brand: MONOJECT

## (undated) DEVICE — COVER,MAYO STAND,STERILE: Brand: MEDLINE

## (undated) DEVICE — REM POLYHESIVE ADULT PATIENT RETURN ELECTRODE: Brand: VALLEYLAB

## (undated) DEVICE — ANCHOR TISSUE RETRIEVAL SYSTEM, BAG SIZE 1200 ML, PORT SIZE 15 MM: Brand: ANCHOR TISSUE RETRIEVAL SYSTEM

## (undated) DEVICE — SUTURE PROLENE 2-0 SH

## (undated) DEVICE — DRAIN RELIAVAC 100CC

## (undated) DEVICE — DRESSING OPTIFOAM AG 3.5X10

## (undated) DEVICE — GLOVE ORTHO ALOETOUCH SZ 8-1/2

## (undated) DEVICE — DRAIN BLAKE ROUND 15FR

## (undated) DEVICE — TOWEL: OR BLU 80/CS: Brand: MEDICAL ACTION INDUSTRIES

## (undated) DEVICE — DRAIN CHANNEL 19FR BLAKE

## (undated) DEVICE — POOLE SUCTION HANDLE: Brand: CARDINAL HEALTH

## (undated) DEVICE — VIOLET BRAIDED (POLYGLACTIN 910), SYNTHETIC ABSORBABLE SUTURE: Brand: COATED VICRYL

## (undated) DEVICE — SUTURE SILK 2-0 SH

## (undated) DEVICE — ENDOPATH XCEL UNIVERSAL TROCAR STABLILITY SLEEVES: Brand: ENDOPATH XCEL

## (undated) DEVICE — CHLORAPREP 26ML APPLICATOR

## (undated) DEVICE — HEX-LOCKING BLADE ELECTRODE: Brand: EDGE

## (undated) DEVICE — 40580 - THE PINK PAD - ADVANCED TRENDELENBURG POSITIONING KIT: Brand: 40580 - THE PINK PAD - ADVANCED TRENDELENBURG POSITIONING KIT

## (undated) DEVICE — CLIP HEMOLOK LARGE PURPLE

## (undated) DEVICE — FENESTRATED BIPOLAR FORCEPS: Brand: ENDOWRIST;DAVINCI SI

## (undated) DEVICE — ELECTRO LUBE IS A SINGLE PATIENT USE DEVICE THAT IS INTENDED TO BE USED ON ELECTROSURGICAL ELECTRODES TO REDUCE STICKING.: Brand: KEY SURGICAL ELECTRO LUBE

## (undated) DEVICE — SPONGE STICK WITH PVP-I: Brand: KENDALL

## (undated) DEVICE — PROGRASP FORCEPS: Brand: ENDOWRIST;DAVINCI SI

## (undated) DEVICE — DRAPE WARMER ORS-300

## (undated) DEVICE — SUTURE PROLENE 4-0 RB-1

## (undated) DEVICE — ECHELON FLEX POWERED PLUS ARTICULATING ENDOSCOPIC LINEAR CUTTER , 60MM: Brand: ECHELON FLEX

## (undated) DEVICE — ABSORBABLE HEMOSTAT (OXIDIZED REGENERATED CELLULOSE, U.S.P.): Brand: SURGICEL

## (undated) DEVICE — LAPAROTOMY SPONGE - RF AND X-RAY DETECTABLE PRE-WASHED: Brand: SITUATE

## (undated) DEVICE — SYRINGE 20CC LL TIP

## (undated) DEVICE — SUTURE VLOC 90 3-0 9\" 0644

## (undated) NOTE — IP AVS SNAPSHOT
[facility] [place] [address] [place], [address] ~ [phone]                Discharge Summary   1/9/2017    Jayden Laughlin           Admission Information        Provider Department    1/9/2017 YAIMA ZAVALA  Ct · DO NOT drive or operative machinery for at least 24 hours  · DO NOT smoke for at least 24 hours  · DO NOT do any strenuous exercise or lifting for at least 48 hours  · DO call your doctor immediately if  · You start bleeding  · There is any change in col 1/19/2017 2:45 PM Mel[place], [address] [facility]      Immunization History as of 1/9/2017  Never Reviewed    No immunizations on file.       Recent Hematology Lab Results  (Last 3 results in the past 90 days)    WBC RBC Hemoglobin Hematocri Medicaid plans. To get signed up and covered, please call [phone] and ask to get set up for an insurance coverage that is in-network with [facility].         Visit Information        Provider Department Dept Phone    1/9/2017  9:00 AM Ed

## (undated) NOTE — IP AVS SNAPSHOT
[facility] [place] [address] [place], [address] ~ [phone]                Discharge Summary   2/21/2017    Danica Ramirez           Admission Information        Provider Department    2/21/2017 Maria De Jesus Funk MD  3nw- Morning Afternoon Evening As Needed    Levothyroxine Sodium 150 MCG Tabs   Last time this was given:  150 mcg on 2/24/2017  6:24 AM   Commonly known as:  SYNTHROID   What changed:  See the new instructions.         TAKE 1 TABLET (150 MCG TOTAL) BY MOUTH ON NAUSEA/VOMITING, OR FOR ANY QUESTIONS/CONCERNS**    Discharge References/Attachments     NEPHRECTOMY, DISCHARGE INSTRUCTIONS (Fijian)      Follow-up Information     Follow up with Radha Marinelli MD.    Specialty:  Family Medicine    Why:  See Dr. Pastor Sadler Recent Hematology Lab Results (cont.)  (Last 3 results in the past 90 days)    Neutrophil % Lymphocyte % Monocyte % Eosinophil % Basophil % Prelim Neut Abs Final Neut Abs Lymphocyte Abso Monocyte Absolu Eosinophil Abso Basophil Absolu    (02/23/17)  63.8 ( - If you don’t have insurance, [facility] has partnered with [facility] [employer] to help you get signed up for insurance coverage.   [facility] [employer] is a Federal Navigator program that can help with your Affordable Care Act cover Most common side effects: Dizziness or feeling lightheaded (especially with standing), heart rate changes, headaches, nausea/vomiting   What to report to your healthcare team:  Dizziness, nausea, chest pain, weakness, numbness           Non-Narcotic Pain M

## (undated) NOTE — LETTER
[facility] Testing Department  Phone: [phone]  Right Fax: [phone] [address] [facility] By:  Mila Moe RN Date: 2/3/17    Patient Name: Lane Aguilera  Surgery Date: 2/21/2017    CSN: [id number]  Medical Recor this transmission in error, please notify us immediately by telephone, and return the original documents to us at the address listed above.

## (undated) NOTE — IP AVS SNAPSHOT
[facility] [place]  [address] [place], [address] ~ [phone]                Discharge Summary   5/12/2017    [facility]           Admission Information        Provider Department    5/12/2017 Jemma Berger MD E Levothyroxine Sodium 150 MCG Tabs   Last time this was given:  150 mcg on 5/15/2017  6:07 AM   Commonly known as:  SYNTHROID   Next dose due:  5/16          TAKE 1 TABLET (150 MCG TOTAL) BY MOUTH ONCE DAILY.     Joel Palmer [address] [facility]  [phone]          Follow up with Thomas Avalos MD In 3 days.     Specialty:  SURGERY, GENERAL    Contact information:    [address]          Sched 264 (05/12/17)  9.9    (03/20/17)  5.50 (03/20/17)  3.92 (L) (03/20/17)  11.6 (L) (03/20/17)  36.4 (L) (03/20/17)  92.9    (03/20/17)  299 (03/20/17)  10.0    (02/24/17)  5.7 (02/24/17)  2.49 (L) (02/24/17)  8.2 (L) (02/24/17)  22.4 (L) (02/24/17)  90.0 - If you are a smoker or have smoked in the last 12 months, we encourage you to explore options for quitting.     - If you have concerns related to behavioral health issues or thoughts of harming yourself, contact [facility] a your medications while at home. Ant-Infective Medications     Amoxicillin-Pot Clavulanate 875-125 MG Oral Tab         Use: Treat infections or suspected infection   Most common side effects:  Allergic reactions, rash, nausea, diarrhea   What to repo